# Patient Record
Sex: MALE | Race: WHITE | Employment: OTHER | URBAN - NONMETROPOLITAN AREA
[De-identification: names, ages, dates, MRNs, and addresses within clinical notes are randomized per-mention and may not be internally consistent; named-entity substitution may affect disease eponyms.]

---

## 2017-01-01 ENCOUNTER — HOSPITAL ENCOUNTER (OUTPATIENT)
Dept: MRI IMAGING | Age: 67
Discharge: HOME | End: 2017-03-20
Payer: OTHER GOVERNMENT

## 2017-01-01 DIAGNOSIS — G95.20 SPINAL CORD COMPRESSION (HCC): ICD-10-CM

## 2017-01-01 PROCEDURE — 6360000004 HC RX CONTRAST MEDICATION: Performed by: INTERNAL MEDICINE

## 2017-01-01 PROCEDURE — A9579 GAD-BASE MR CONTRAST NOS,1ML: HCPCS | Performed by: INTERNAL MEDICINE

## 2017-01-01 PROCEDURE — 72158 MRI LUMBAR SPINE W/O & W/DYE: CPT

## 2017-01-01 RX ADMIN — GADOPENTETATE DIMEGLUMINE 20 ML: 469.01 INJECTION INTRAVENOUS at 16:25

## 2017-03-01 ENCOUNTER — APPOINTMENT (OUTPATIENT)
Dept: GENERAL RADIOLOGY | Facility: HOSPITAL | Age: 67
End: 2017-03-01

## 2017-03-01 ENCOUNTER — APPOINTMENT (OUTPATIENT)
Dept: CT IMAGING | Facility: HOSPITAL | Age: 67
End: 2017-03-01

## 2017-03-01 ENCOUNTER — HOSPITAL ENCOUNTER (EMERGENCY)
Facility: HOSPITAL | Age: 67
Discharge: HOME | End: 2017-03-01
Attending: EMERGENCY MEDICINE | Admitting: EMERGENCY MEDICINE

## 2017-03-01 VITALS
HEART RATE: 90 BPM | RESPIRATION RATE: 16 BRPM | BODY MASS INDEX: 31.15 KG/M2 | WEIGHT: 230 LBS | SYSTOLIC BLOOD PRESSURE: 110 MMHG | TEMPERATURE: 98 F | OXYGEN SATURATION: 95 % | DIASTOLIC BLOOD PRESSURE: 70 MMHG | HEIGHT: 72 IN

## 2017-03-01 DIAGNOSIS — R91.8 LUNG MASS: ICD-10-CM

## 2017-03-01 DIAGNOSIS — R09.1 PLEURISY: Primary | ICD-10-CM

## 2017-03-01 DIAGNOSIS — J34.0 CELLULITIS OF NOSE: ICD-10-CM

## 2017-03-01 DIAGNOSIS — R16.0 LIVER MASS: ICD-10-CM

## 2017-03-01 LAB
ALBUMIN SERPL-MCNC: 3.7 G/DL (ref 3.5–5)
ALBUMIN/GLOB SERPL: 0.9 G/DL (ref 1.1–2.5)
ALP SERPL-CCNC: 180 U/L (ref 24–120)
ALT SERPL W P-5'-P-CCNC: 19 U/L (ref 0–54)
AMYLASE SERPL-CCNC: 45 U/L (ref 30–110)
ANION GAP SERPL CALCULATED.3IONS-SCNC: 11 MMOL/L (ref 4–13)
APTT PPP: 38.3 SECONDS (ref 24.1–34.8)
AST SERPL-CCNC: 24 U/L (ref 7–45)
BASOPHILS # BLD AUTO: 0.06 10*3/MM3 (ref 0–0.2)
BASOPHILS NFR BLD AUTO: 0.5 % (ref 0–2)
BILIRUB SERPL-MCNC: 0.7 MG/DL (ref 0.1–1)
BUN BLD-MCNC: 16 MG/DL (ref 5–21)
BUN/CREAT SERPL: 13.6 (ref 7–25)
CALCIUM SPEC-SCNC: 9.7 MG/DL (ref 8.4–10.4)
CHLORIDE SERPL-SCNC: 95 MMOL/L (ref 98–110)
CO2 SERPL-SCNC: 30 MMOL/L (ref 24–31)
CREAT BLD-MCNC: 1.18 MG/DL (ref 0.5–1.4)
D DIMER PPP FEU-MCNC: 1.77 MG/L (FEU) (ref 0–0.5)
DEPRECATED RDW RBC AUTO: 43.3 FL (ref 40–54)
EOSINOPHIL # BLD AUTO: 0.16 10*3/MM3 (ref 0–0.7)
EOSINOPHIL NFR BLD AUTO: 1.4 % (ref 0–4)
ERYTHROCYTE [DISTWIDTH] IN BLOOD BY AUTOMATED COUNT: 14.3 % (ref 12–15)
FLUAV AG NPH QL: NEGATIVE
FLUBV AG NPH QL IA: NEGATIVE
GFR SERPL CREATININE-BSD FRML MDRD: 62 ML/MIN/1.73
GLOBULIN UR ELPH-MCNC: 4.3 GM/DL
GLUCOSE BLD-MCNC: 109 MG/DL (ref 70–100)
HCT VFR BLD AUTO: 33 % (ref 40–52)
HGB BLD-MCNC: 10.5 G/DL (ref 14–18)
IMM GRANULOCYTES # BLD: 0.34 10*3/MM3 (ref 0–0.03)
IMM GRANULOCYTES NFR BLD: 3 % (ref 0–5)
INR PPP: 1.03 (ref 0.91–1.09)
LIPASE SERPL-CCNC: 18 U/L (ref 23–203)
LYMPHOCYTES # BLD AUTO: 1.25 10*3/MM3 (ref 0.72–4.86)
LYMPHOCYTES NFR BLD AUTO: 11 % (ref 15–45)
MCH RBC QN AUTO: 25.9 PG (ref 28–32)
MCHC RBC AUTO-ENTMCNC: 31.8 G/DL (ref 33–36)
MCV RBC AUTO: 81.5 FL (ref 82–95)
MONOCYTES # BLD AUTO: 1.13 10*3/MM3 (ref 0.19–1.3)
MONOCYTES NFR BLD AUTO: 9.9 % (ref 4–12)
NEUTROPHILS # BLD AUTO: 8.42 10*3/MM3 (ref 1.87–8.4)
NEUTROPHILS NFR BLD AUTO: 74.2 % (ref 39–78)
NT-PROBNP SERPL-MCNC: 242 PG/ML (ref 0–900)
PLATELET # BLD AUTO: 241 10*3/MM3 (ref 130–400)
PMV BLD AUTO: 9.3 FL (ref 6–12)
POTASSIUM BLD-SCNC: 4.1 MMOL/L (ref 3.5–5.3)
PROT SERPL-MCNC: 8 G/DL (ref 6.3–8.7)
PROTHROMBIN TIME: 13.8 SECONDS (ref 11.9–14.6)
RBC # BLD AUTO: 4.05 10*6/MM3 (ref 4.8–5.9)
SODIUM BLD-SCNC: 136 MMOL/L (ref 135–145)
TROPONIN I SERPL-MCNC: 0 NG/ML (ref 0–0.07)
TROPONIN I SERPL-MCNC: 0 NG/ML (ref 0–0.07)
WBC NRBC COR # BLD: 11.36 10*3/MM3 (ref 4.8–10.8)

## 2017-03-01 PROCEDURE — 96374 THER/PROPH/DIAG INJ IV PUSH: CPT

## 2017-03-01 PROCEDURE — 85379 FIBRIN DEGRADATION QUANT: CPT | Performed by: EMERGENCY MEDICINE

## 2017-03-01 PROCEDURE — 25010000002 HYDROMORPHONE PER 4 MG: Performed by: EMERGENCY MEDICINE

## 2017-03-01 PROCEDURE — 96376 TX/PRO/DX INJ SAME DRUG ADON: CPT

## 2017-03-01 PROCEDURE — 85730 THROMBOPLASTIN TIME PARTIAL: CPT | Performed by: EMERGENCY MEDICINE

## 2017-03-01 PROCEDURE — 83690 ASSAY OF LIPASE: CPT | Performed by: EMERGENCY MEDICINE

## 2017-03-01 PROCEDURE — 84484 ASSAY OF TROPONIN QUANT: CPT

## 2017-03-01 PROCEDURE — 87804 INFLUENZA ASSAY W/OPTIC: CPT | Performed by: EMERGENCY MEDICINE

## 2017-03-01 PROCEDURE — 85610 PROTHROMBIN TIME: CPT | Performed by: EMERGENCY MEDICINE

## 2017-03-01 PROCEDURE — 71010 HC CHEST PA OR AP: CPT

## 2017-03-01 PROCEDURE — 71275 CT ANGIOGRAPHY CHEST: CPT

## 2017-03-01 PROCEDURE — 83880 ASSAY OF NATRIURETIC PEPTIDE: CPT | Performed by: EMERGENCY MEDICINE

## 2017-03-01 PROCEDURE — 96375 TX/PRO/DX INJ NEW DRUG ADDON: CPT

## 2017-03-01 PROCEDURE — 99283 EMERGENCY DEPT VISIT LOW MDM: CPT

## 2017-03-01 PROCEDURE — 93005 ELECTROCARDIOGRAM TRACING: CPT

## 2017-03-01 PROCEDURE — 93005 ELECTROCARDIOGRAM TRACING: CPT | Performed by: EMERGENCY MEDICINE

## 2017-03-01 PROCEDURE — 93010 ELECTROCARDIOGRAM REPORT: CPT | Performed by: INTERNAL MEDICINE

## 2017-03-01 PROCEDURE — 85025 COMPLETE CBC W/AUTO DIFF WBC: CPT | Performed by: EMERGENCY MEDICINE

## 2017-03-01 PROCEDURE — 82150 ASSAY OF AMYLASE: CPT | Performed by: EMERGENCY MEDICINE

## 2017-03-01 PROCEDURE — 25010000002 ONDANSETRON PER 1 MG: Performed by: EMERGENCY MEDICINE

## 2017-03-01 PROCEDURE — 80053 COMPREHEN METABOLIC PANEL: CPT | Performed by: EMERGENCY MEDICINE

## 2017-03-01 PROCEDURE — 0 IOPAMIDOL PER 1 ML: Performed by: EMERGENCY MEDICINE

## 2017-03-01 PROCEDURE — 36415 COLL VENOUS BLD VENIPUNCTURE: CPT | Performed by: EMERGENCY MEDICINE

## 2017-03-01 PROCEDURE — 74177 CT ABD & PELVIS W/CONTRAST: CPT

## 2017-03-01 RX ORDER — CYCLOBENZAPRINE HCL 10 MG
10 TABLET ORAL 3 TIMES DAILY
COMMUNITY

## 2017-03-01 RX ORDER — TRAZODONE HYDROCHLORIDE 100 MG/1
50 TABLET ORAL NIGHTLY
COMMUNITY

## 2017-03-01 RX ORDER — ZOLPIDEM TARTRATE 10 MG/1
10 TABLET ORAL NIGHTLY PRN
COMMUNITY
End: 2017-03-30 | Stop reason: HOSPADM

## 2017-03-01 RX ORDER — GABAPENTIN 300 MG/1
300 CAPSULE ORAL NIGHTLY
COMMUNITY

## 2017-03-01 RX ORDER — ALPRAZOLAM 1 MG/1
2 TABLET ORAL DAILY
Status: ON HOLD | COMMUNITY
End: 2017-03-30

## 2017-03-01 RX ORDER — HYDROCODONE BITARTRATE AND ACETAMINOPHEN 10; 325 MG/1; MG/1
2 TABLET ORAL EVERY 4 HOURS PRN
COMMUNITY
End: 2017-03-30 | Stop reason: HOSPADM

## 2017-03-01 RX ORDER — ALBUTEROL SULFATE 2.5 MG/3ML
2.5 SOLUTION RESPIRATORY (INHALATION) EVERY 4 HOURS PRN
COMMUNITY

## 2017-03-01 RX ORDER — PANTOPRAZOLE SODIUM 40 MG/1
40 TABLET, DELAYED RELEASE ORAL EVERY MORNING
COMMUNITY

## 2017-03-01 RX ORDER — LOSARTAN POTASSIUM 100 MG/1
50 TABLET ORAL DAILY
COMMUNITY

## 2017-03-01 RX ORDER — SULFAMETHOXAZOLE AND TRIMETHOPRIM 800; 160 MG/1; MG/1
1 TABLET ORAL 2 TIMES DAILY
Qty: 20 TABLET | Refills: 0 | Status: ON HOLD | OUTPATIENT
Start: 2017-03-01 | End: 2017-03-28

## 2017-03-01 RX ORDER — DULOXETIN HYDROCHLORIDE 60 MG/1
120 CAPSULE, DELAYED RELEASE ORAL NIGHTLY
COMMUNITY

## 2017-03-01 RX ORDER — METHYLPREDNISOLONE 4 MG/1
TABLET ORAL
Qty: 21 TABLET | Refills: 0 | Status: ON HOLD | OUTPATIENT
Start: 2017-03-01 | End: 2017-03-28

## 2017-03-01 RX ORDER — ONDANSETRON 2 MG/ML
4 INJECTION INTRAMUSCULAR; INTRAVENOUS ONCE
Status: COMPLETED | OUTPATIENT
Start: 2017-03-01 | End: 2017-03-01

## 2017-03-01 RX ORDER — NABUMETONE 750 MG/1
750 TABLET, FILM COATED ORAL EVERY 12 HOURS
COMMUNITY

## 2017-03-01 RX ORDER — HYDROMORPHONE HYDROCHLORIDE 4 MG/1
4 TABLET ORAL
COMMUNITY
End: 2017-04-12 | Stop reason: SDUPTHER

## 2017-03-01 RX ADMIN — HYDROMORPHONE HYDROCHLORIDE 1 MG: 1 INJECTION, SOLUTION INTRAMUSCULAR; INTRAVENOUS; SUBCUTANEOUS at 19:40

## 2017-03-01 RX ADMIN — IOPAMIDOL 150 ML: 755 INJECTION, SOLUTION INTRAVENOUS at 17:37

## 2017-03-01 RX ADMIN — ONDANSETRON 4 MG: 2 INJECTION INTRAMUSCULAR; INTRAVENOUS at 19:39

## 2017-03-01 RX ADMIN — HYDROMORPHONE HYDROCHLORIDE 1 MG: 1 INJECTION, SOLUTION INTRAMUSCULAR; INTRAVENOUS; SUBCUTANEOUS at 16:14

## 2017-03-01 RX ADMIN — ONDANSETRON 4 MG: 2 INJECTION INTRAMUSCULAR; INTRAVENOUS at 16:14

## 2017-03-01 NOTE — ED PROVIDER NOTES
"Subjective chest pain, shortness of breath, abdominal pain and decreased urine output  History of Present Illness  Mr. Lundy presents today with complaint of chest pain, shortness of breath and decreased urine output.  He tells me that the generalized symptoms started approximately one week ago.  His wife states that he is having chest pain  As well as shortness of breath.   He tells me he has pain everywhere.   He does admit that the pain he has worsens with deep inspiration.  The cough he has been experiencing has been productive of green sputum.     He had a very severe illness last year and he does not want to have a repeat of that illness.  He tells me that he almost  from pnuemococcal sepsis.    He does have a history of COPD but does not feel that he is having a flare up.  He also complained of abdominal pain and decreased urine output over the last 24 hours.    Review of Systems   HENT: Negative.    Eyes: Negative.    Respiratory: Positive for cough, chest tightness and shortness of breath. Negative for wheezing.    Cardiovascular: Positive for chest pain.   Gastrointestinal: Positive for abdominal pain. Negative for diarrhea, nausea and vomiting.   Endocrine: Negative.    Genitourinary: Positive for decreased urine volume.   Musculoskeletal: Negative.    Skin: Negative.    Allergic/Immunologic: Negative.    Neurological: Positive for weakness.   Hematological: Negative.    Psychiatric/Behavioral: Negative.    All other systems reviewed and are negative.      Past Medical History   Diagnosis Date   • Chronic pain    • COPD (chronic obstructive pulmonary disease)    • GERD (gastroesophageal reflux disease)    • Hypertension        Allergies   Allergen Reactions   • Fentanyl Mental Status Change     Made aggressive, \"out of my mind\"       History reviewed. No pertinent past surgical history.    History reviewed. No pertinent family history.    Social History     Social History   • Marital status: "      Spouse name: N/A   • Number of children: N/A   • Years of education: N/A     Social History Main Topics   • Smoking status: None   • Smokeless tobacco: None   • Alcohol use None   • Drug use: None   • Sexual activity: Not Asked     Other Topics Concern   • None     Social History Narrative   • None           Objective   Physical Exam   Constitutional: He is oriented to person, place, and time. He appears well-developed and well-nourished.   HENT:   Head: Normocephalic and atraumatic.   Right Ear: External ear normal.   Left Ear: External ear normal.   Nose: Nose normal.   Mouth/Throat: Oropharynx is clear and moist.   Eyes: Conjunctivae and EOM are normal. Pupils are equal, round, and reactive to light. Right eye exhibits no discharge. Left eye exhibits no discharge.   Neck: Normal range of motion. Neck supple. No thyromegaly present.   Cardiovascular: Normal rate, regular rhythm, normal heart sounds and intact distal pulses.  Exam reveals no friction rub.    No murmur heard.  Pulmonary/Chest: Effort normal and breath sounds normal. No respiratory distress.   Abdominal: Soft. Bowel sounds are normal. He exhibits no distension. There is no tenderness.   Musculoskeletal: Normal range of motion. He exhibits no edema or deformity.   Neurological: He is alert and oriented to person, place, and time. He has normal reflexes. No cranial nerve deficit.   Skin: Skin is warm and dry. No rash noted.   Psychiatric: Judgment normal.   Nursing note and vitals reviewed.      Procedures         ED Course  ED Course   Comment By Time   Told the patient his testing was negative for MI and sounds pleuritic and we can treat that.  However, his CTs show possible CA with liver mets and that needs prompt evaluation.  [facility] so I told him to make appointment with them as soon as possible and gave him copies of the CT reports to take to them. Carlos Holm Jr., MD 03/01 1953                   MDM    Final diagnoses:   Pleurisy   Lung mass   Liver mass   Cellulitis of nose            Carlos Holm Jr., MD  03/01/17 1953

## 2017-03-02 NOTE — PROGRESS NOTES
Clinical from ED visit faxed to Karen at [facility].  Li Alcantar RN.3/1/2017.9:04 PM.

## 2017-03-28 ENCOUNTER — HOSPITAL ENCOUNTER (INPATIENT)
Facility: HOSPITAL | Age: 67
LOS: 2 days | Discharge: HOME | End: 2017-03-30
Attending: INTERNAL MEDICINE | Admitting: INTERNAL MEDICINE

## 2017-03-28 ENCOUNTER — APPOINTMENT (OUTPATIENT)
Dept: MRI IMAGING | Facility: HOSPITAL | Age: 67
End: 2017-03-28

## 2017-03-28 DIAGNOSIS — C79.51 PAIN FROM BONE METASTASES (HCC): Primary | ICD-10-CM

## 2017-03-28 DIAGNOSIS — C78.7 LIVER METASTASIS: ICD-10-CM

## 2017-03-28 DIAGNOSIS — G89.3 PAIN FROM BONE METASTASES (HCC): Primary | ICD-10-CM

## 2017-03-28 PROBLEM — R51.9 HEADACHE: Status: ACTIVE | Noted: 2017-03-28

## 2017-03-28 PROBLEM — R29.898 LOWER EXTREMITY WEAKNESS: Status: ACTIVE | Noted: 2017-03-28

## 2017-03-28 PROBLEM — M48.56XA PATHOLOGIC COMPRESSION FRACTURE OF LUMBAR VERTEBRA (HCC): Status: ACTIVE | Noted: 2017-03-28

## 2017-03-28 PROBLEM — R11.0 NAUSEA: Status: ACTIVE | Noted: 2017-03-28

## 2017-03-28 PROBLEM — R91.1 LUNG NODULE: Status: ACTIVE | Noted: 2017-03-28

## 2017-03-28 LAB
ALBUMIN SERPL-MCNC: 3.6 G/DL (ref 3.5–5)
ALBUMIN/GLOB SERPL: 0.9 G/DL (ref 1.1–2.5)
ALP SERPL-CCNC: 204 U/L (ref 24–120)
ALT SERPL W P-5'-P-CCNC: 26 U/L (ref 0–54)
ANION GAP SERPL CALCULATED.3IONS-SCNC: 12 MMOL/L (ref 4–13)
APTT PPP: 30.1 SECONDS (ref 24.1–34.8)
AST SERPL-CCNC: 44 U/L (ref 7–45)
BILIRUB SERPL-MCNC: 0.5 MG/DL (ref 0.1–1)
BUN BLD-MCNC: 31 MG/DL (ref 5–21)
BUN/CREAT SERPL: 30.4 (ref 7–25)
CALCIUM SPEC-SCNC: 9.9 MG/DL (ref 8.4–10.4)
CHLORIDE SERPL-SCNC: 96 MMOL/L (ref 98–110)
CO2 SERPL-SCNC: 28 MMOL/L (ref 24–31)
CREAT BLD-MCNC: 1.02 MG/DL (ref 0.5–1.4)
DEPRECATED RDW RBC AUTO: 47.7 FL (ref 40–54)
ERYTHROCYTE [DISTWIDTH] IN BLOOD BY AUTOMATED COUNT: 16.6 % (ref 12–15)
GFR SERPL CREATININE-BSD FRML MDRD: 73 ML/MIN/1.73
GLOBULIN UR ELPH-MCNC: 3.8 GM/DL
GLUCOSE BLD-MCNC: 101 MG/DL (ref 70–100)
HCT VFR BLD AUTO: 33.7 % (ref 40–52)
HGB BLD-MCNC: 10.6 G/DL (ref 14–18)
INR PPP: 1.02 (ref 0.91–1.09)
MCH RBC QN AUTO: 25.5 PG (ref 28–32)
MCHC RBC AUTO-ENTMCNC: 31.5 G/DL (ref 33–36)
MCV RBC AUTO: 81 FL (ref 82–95)
PLATELET # BLD AUTO: 277 10*3/MM3 (ref 130–400)
PMV BLD AUTO: 9.8 FL (ref 6–12)
POTASSIUM BLD-SCNC: 4.3 MMOL/L (ref 3.5–5.3)
PROT SERPL-MCNC: 7.4 G/DL (ref 6.3–8.7)
PROTHROMBIN TIME: 13.7 SECONDS (ref 11.9–14.6)
RBC # BLD AUTO: 4.16 10*6/MM3 (ref 4.8–5.9)
SODIUM BLD-SCNC: 136 MMOL/L (ref 135–145)
WBC NRBC COR # BLD: 22.11 10*3/MM3 (ref 4.8–10.8)

## 2017-03-28 PROCEDURE — 85027 COMPLETE CBC AUTOMATED: CPT | Performed by: NURSE PRACTITIONER

## 2017-03-28 PROCEDURE — 25010000002 HYDROMORPHONE PER 4 MG: Performed by: NURSE PRACTITIONER

## 2017-03-28 PROCEDURE — A9577 INJ MULTIHANCE: HCPCS | Performed by: INTERNAL MEDICINE

## 2017-03-28 PROCEDURE — 72157 MRI CHEST SPINE W/O & W/DYE: CPT

## 2017-03-28 PROCEDURE — 85730 THROMBOPLASTIN TIME PARTIAL: CPT | Performed by: NURSE PRACTITIONER

## 2017-03-28 PROCEDURE — 80053 COMPREHEN METABOLIC PANEL: CPT | Performed by: NURSE PRACTITIONER

## 2017-03-28 PROCEDURE — 70553 MRI BRAIN STEM W/O & W/DYE: CPT

## 2017-03-28 PROCEDURE — 85610 PROTHROMBIN TIME: CPT | Performed by: NURSE PRACTITIONER

## 2017-03-28 PROCEDURE — 63710000001 DEXAMETHASONE PER 0.25 MG: Performed by: NURSE PRACTITIONER

## 2017-03-28 PROCEDURE — 0 GADOBENATE DIMEGLUMINE 529 MG/ML SOLUTION: Performed by: INTERNAL MEDICINE

## 2017-03-28 RX ORDER — CYCLOBENZAPRINE HCL 10 MG
10 TABLET ORAL EVERY 8 HOURS SCHEDULED
Status: DISCONTINUED | OUTPATIENT
Start: 2017-03-28 | End: 2017-03-30 | Stop reason: HOSPADM

## 2017-03-28 RX ORDER — SODIUM CHLORIDE 0.9 % (FLUSH) 0.9 %
1-10 SYRINGE (ML) INJECTION AS NEEDED
Status: DISCONTINUED | OUTPATIENT
Start: 2017-03-28 | End: 2017-03-30 | Stop reason: HOSPADM

## 2017-03-28 RX ORDER — POLYETHYLENE GLYCOL 3350 17 G/17G
17 POWDER, FOR SOLUTION ORAL NIGHTLY
COMMUNITY

## 2017-03-28 RX ORDER — HYDROCODONE BITARTRATE AND ACETAMINOPHEN 10; 325 MG/1; MG/1
1 TABLET ORAL EVERY 4 HOURS PRN
Status: DISCONTINUED | OUTPATIENT
Start: 2017-03-28 | End: 2017-03-30 | Stop reason: HOSPADM

## 2017-03-28 RX ORDER — DULOXETIN HYDROCHLORIDE 30 MG/1
120 CAPSULE, DELAYED RELEASE ORAL DAILY
Status: DISCONTINUED | OUTPATIENT
Start: 2017-03-28 | End: 2017-03-30 | Stop reason: HOSPADM

## 2017-03-28 RX ORDER — POLYETHYLENE GLYCOL 3350 17 G/17G
17 POWDER, FOR SOLUTION ORAL DAILY
Status: DISCONTINUED | OUTPATIENT
Start: 2017-03-28 | End: 2017-03-29 | Stop reason: SDUPTHER

## 2017-03-28 RX ORDER — ONDANSETRON 2 MG/ML
4 INJECTION INTRAMUSCULAR; INTRAVENOUS EVERY 6 HOURS PRN
Status: DISCONTINUED | OUTPATIENT
Start: 2017-03-28 | End: 2017-03-30 | Stop reason: HOSPADM

## 2017-03-28 RX ORDER — TRAZODONE HYDROCHLORIDE 50 MG/1
50 TABLET ORAL NIGHTLY
Status: DISCONTINUED | OUTPATIENT
Start: 2017-03-28 | End: 2017-03-30 | Stop reason: HOSPADM

## 2017-03-28 RX ORDER — UREA 10 %
3 LOTION (ML) TOPICAL DAILY
Status: ON HOLD | COMMUNITY
End: 2017-03-30

## 2017-03-28 RX ORDER — SODIUM CHLORIDE 9 MG/ML
50 INJECTION, SOLUTION INTRAVENOUS CONTINUOUS
Status: DISCONTINUED | OUTPATIENT
Start: 2017-03-28 | End: 2017-03-30 | Stop reason: HOSPADM

## 2017-03-28 RX ORDER — DEXAMETHASONE 4 MG/1
4 TABLET ORAL EVERY 6 HOURS SCHEDULED
Status: DISCONTINUED | OUTPATIENT
Start: 2017-03-28 | End: 2017-03-30 | Stop reason: HOSPADM

## 2017-03-28 RX ORDER — PANTOPRAZOLE SODIUM 40 MG/10ML
40 INJECTION, POWDER, LYOPHILIZED, FOR SOLUTION INTRAVENOUS
Status: DISCONTINUED | OUTPATIENT
Start: 2017-03-28 | End: 2017-03-30 | Stop reason: HOSPADM

## 2017-03-28 RX ORDER — ALPRAZOLAM 0.5 MG/1
1 TABLET ORAL 2 TIMES DAILY PRN
Status: DISCONTINUED | OUTPATIENT
Start: 2017-03-28 | End: 2017-03-30 | Stop reason: HOSPADM

## 2017-03-28 RX ORDER — ZOLPIDEM TARTRATE 5 MG/1
5 TABLET ORAL NIGHTLY PRN
Status: DISCONTINUED | OUTPATIENT
Start: 2017-03-28 | End: 2017-03-30 | Stop reason: HOSPADM

## 2017-03-28 RX ORDER — HYDROMORPHONE HCL 110MG/55ML
2 PATIENT CONTROLLED ANALGESIA SYRINGE INTRAVENOUS
Status: DISCONTINUED | OUTPATIENT
Start: 2017-03-28 | End: 2017-03-30 | Stop reason: HOSPADM

## 2017-03-28 RX ADMIN — DEXAMETHASONE 4 MG: 4 TABLET ORAL at 23:23

## 2017-03-28 RX ADMIN — DEXAMETHASONE 4 MG: 4 TABLET ORAL at 19:20

## 2017-03-28 RX ADMIN — CYCLOBENZAPRINE HYDROCHLORIDE 10 MG: 10 TABLET, FILM COATED ORAL at 19:20

## 2017-03-28 RX ADMIN — HYDROCODONE BITARTRATE AND ACETAMINOPHEN 1 TABLET: 10; 325 TABLET ORAL at 19:20

## 2017-03-28 RX ADMIN — POLYETHYLENE GLYCOL 3350 17 G: 17 POWDER, FOR SOLUTION ORAL at 19:55

## 2017-03-28 RX ADMIN — HYDROCODONE BITARTRATE AND ACETAMINOPHEN 1 TABLET: 10; 325 TABLET ORAL at 23:22

## 2017-03-28 RX ADMIN — DULOXETINE HYDROCHLORIDE 120 MG: 30 CAPSULE, DELAYED RELEASE ORAL at 19:19

## 2017-03-28 RX ADMIN — ZOLPIDEM TARTRATE 5 MG: 5 TABLET, FILM COATED ORAL at 20:38

## 2017-03-28 RX ADMIN — TRAZODONE HYDROCHLORIDE 50 MG: 50 TABLET ORAL at 20:38

## 2017-03-28 RX ADMIN — ALPRAZOLAM 1 MG: 0.5 TABLET ORAL at 20:44

## 2017-03-28 RX ADMIN — HYDROMORPHONE HYDROCHLORIDE 2 MG: 2 INJECTION, SOLUTION INTRAMUSCULAR; INTRAVENOUS; SUBCUTANEOUS at 20:38

## 2017-03-28 RX ADMIN — GADOBENATE DIMEGLUMINE 15 ML: 529 INJECTION, SOLUTION INTRAVENOUS at 19:30

## 2017-03-28 RX ADMIN — CYCLOBENZAPRINE HYDROCHLORIDE 10 MG: 10 TABLET, FILM COATED ORAL at 23:22

## 2017-03-28 RX ADMIN — HYDROMORPHONE HYDROCHLORIDE 2 MG: 2 INJECTION, SOLUTION INTRAMUSCULAR; INTRAVENOUS; SUBCUTANEOUS at 17:21

## 2017-03-28 RX ADMIN — PANTOPRAZOLE SODIUM 40 MG: 40 INJECTION, POWDER, FOR SOLUTION INTRAVENOUS at 19:20

## 2017-03-28 RX ADMIN — SODIUM CHLORIDE 50 ML/HR: 9 INJECTION, SOLUTION INTRAVENOUS at 17:22

## 2017-03-29 ENCOUNTER — APPOINTMENT (OUTPATIENT)
Dept: NUCLEAR MEDICINE | Facility: HOSPITAL | Age: 67
End: 2017-03-29

## 2017-03-29 ENCOUNTER — APPOINTMENT (OUTPATIENT)
Dept: CT IMAGING | Facility: HOSPITAL | Age: 67
End: 2017-03-29

## 2017-03-29 ENCOUNTER — APPOINTMENT (OUTPATIENT)
Dept: MRI IMAGING | Facility: HOSPITAL | Age: 67
End: 2017-03-29

## 2017-03-29 PROCEDURE — 88305 TISSUE EXAM BY PATHOLOGIST: CPT | Performed by: INTERNAL MEDICINE

## 2017-03-29 PROCEDURE — 88342 IMHCHEM/IMCYTCHM 1ST ANTB: CPT | Performed by: INTERNAL MEDICINE

## 2017-03-29 PROCEDURE — 25010000002 HYDROMORPHONE PER 4 MG: Performed by: NURSE PRACTITIONER

## 2017-03-29 PROCEDURE — 77012 CT SCAN FOR NEEDLE BIOPSY: CPT

## 2017-03-29 PROCEDURE — 88172 CYTP DX EVAL FNA 1ST EA SITE: CPT | Performed by: INTERNAL MEDICINE

## 2017-03-29 PROCEDURE — 63710000001 DEXAMETHASONE PER 0.25 MG: Performed by: NURSE PRACTITIONER

## 2017-03-29 PROCEDURE — 25010000002 ONDANSETRON PER 1 MG: Performed by: NURSE PRACTITIONER

## 2017-03-29 PROCEDURE — 0 TECHNETIUM OXIDRONATE KIT: Performed by: INTERNAL MEDICINE

## 2017-03-29 PROCEDURE — 88341 IMHCHEM/IMCYTCHM EA ADD ANTB: CPT | Performed by: INTERNAL MEDICINE

## 2017-03-29 PROCEDURE — 99222 1ST HOSP IP/OBS MODERATE 55: CPT | Performed by: NEUROLOGICAL SURGERY

## 2017-03-29 PROCEDURE — 0FB13ZX EXCISION OF RIGHT LOBE LIVER, PERCUTANEOUS APPROACH, DIAGNOSTIC: ICD-10-PCS | Performed by: INTERNAL MEDICINE

## 2017-03-29 PROCEDURE — 88334 PATH CONSLTJ SURG CYTO XM EA: CPT | Performed by: INTERNAL MEDICINE

## 2017-03-29 PROCEDURE — 0 GADOBENATE DIMEGLUMINE 529 MG/ML SOLUTION: Performed by: INTERNAL MEDICINE

## 2017-03-29 PROCEDURE — 88177 CYTP FNA EVAL EA ADDL: CPT | Performed by: INTERNAL MEDICINE

## 2017-03-29 PROCEDURE — 78306 BONE IMAGING WHOLE BODY: CPT

## 2017-03-29 PROCEDURE — 88173 CYTOPATH EVAL FNA REPORT: CPT | Performed by: INTERNAL MEDICINE

## 2017-03-29 PROCEDURE — A9577 INJ MULTIHANCE: HCPCS | Performed by: INTERNAL MEDICINE

## 2017-03-29 PROCEDURE — A9561 TC99M OXIDRONATE: HCPCS | Performed by: INTERNAL MEDICINE

## 2017-03-29 PROCEDURE — 72156 MRI NECK SPINE W/O & W/DYE: CPT

## 2017-03-29 RX ORDER — POLYETHYLENE GLYCOL 3350 17 G/17G
17 POWDER, FOR SOLUTION ORAL NIGHTLY
Status: DISCONTINUED | OUTPATIENT
Start: 2017-03-29 | End: 2017-03-30 | Stop reason: HOSPADM

## 2017-03-29 RX ADMIN — CYCLOBENZAPRINE HYDROCHLORIDE 10 MG: 10 TABLET, FILM COATED ORAL at 22:02

## 2017-03-29 RX ADMIN — HYDROCODONE BITARTRATE AND ACETAMINOPHEN 1 TABLET: 10; 325 TABLET ORAL at 14:19

## 2017-03-29 RX ADMIN — CYCLOBENZAPRINE HYDROCHLORIDE 10 MG: 10 TABLET, FILM COATED ORAL at 14:18

## 2017-03-29 RX ADMIN — TRAZODONE HYDROCHLORIDE 50 MG: 50 TABLET ORAL at 20:15

## 2017-03-29 RX ADMIN — DEXAMETHASONE 4 MG: 4 TABLET ORAL at 06:26

## 2017-03-29 RX ADMIN — ZOLPIDEM TARTRATE 5 MG: 5 TABLET, FILM COATED ORAL at 20:15

## 2017-03-29 RX ADMIN — DEXAMETHASONE 4 MG: 4 TABLET ORAL at 22:02

## 2017-03-29 RX ADMIN — HYDROMORPHONE HYDROCHLORIDE 2 MG: 2 INJECTION, SOLUTION INTRAMUSCULAR; INTRAVENOUS; SUBCUTANEOUS at 03:34

## 2017-03-29 RX ADMIN — HYDROCODONE BITARTRATE AND ACETAMINOPHEN 1 TABLET: 10; 325 TABLET ORAL at 18:10

## 2017-03-29 RX ADMIN — HYDROMORPHONE HYDROCHLORIDE 2 MG: 2 INJECTION, SOLUTION INTRAMUSCULAR; INTRAVENOUS; SUBCUTANEOUS at 13:06

## 2017-03-29 RX ADMIN — HYDROMORPHONE HYDROCHLORIDE 2 MG: 2 INJECTION, SOLUTION INTRAMUSCULAR; INTRAVENOUS; SUBCUTANEOUS at 19:13

## 2017-03-29 RX ADMIN — GADOBENATE DIMEGLUMINE 20 ML: 529 INJECTION, SOLUTION INTRAVENOUS at 12:15

## 2017-03-29 RX ADMIN — ONDANSETRON HYDROCHLORIDE 4 MG: 2 SOLUTION INTRAMUSCULAR; INTRAVENOUS at 14:18

## 2017-03-29 RX ADMIN — HYDROMORPHONE HYDROCHLORIDE 2 MG: 2 INJECTION, SOLUTION INTRAMUSCULAR; INTRAVENOUS; SUBCUTANEOUS at 08:40

## 2017-03-29 RX ADMIN — ALPRAZOLAM 1 MG: 0.5 TABLET ORAL at 22:12

## 2017-03-29 RX ADMIN — CYCLOBENZAPRINE HYDROCHLORIDE 10 MG: 10 TABLET, FILM COATED ORAL at 06:26

## 2017-03-29 RX ADMIN — HYDROCODONE BITARTRATE AND ACETAMINOPHEN 1 TABLET: 10; 325 TABLET ORAL at 06:26

## 2017-03-29 RX ADMIN — DULOXETINE HYDROCHLORIDE 120 MG: 30 CAPSULE, DELAYED RELEASE ORAL at 14:18

## 2017-03-29 RX ADMIN — HYDROMORPHONE HYDROCHLORIDE 2 MG: 2 INJECTION, SOLUTION INTRAMUSCULAR; INTRAVENOUS; SUBCUTANEOUS at 16:17

## 2017-03-29 RX ADMIN — HYDROMORPHONE HYDROCHLORIDE 2 MG: 2 INJECTION, SOLUTION INTRAMUSCULAR; INTRAVENOUS; SUBCUTANEOUS at 22:02

## 2017-03-29 RX ADMIN — PANTOPRAZOLE SODIUM 40 MG: 40 INJECTION, POWDER, FOR SOLUTION INTRAVENOUS at 06:26

## 2017-03-29 RX ADMIN — TECHNETIUM TC 99M OXIDRONATE 1 DOSE: 3.15 INJECTION, POWDER, LYOPHILIZED, FOR SOLUTION INTRAVENOUS at 10:25

## 2017-03-29 RX ADMIN — POLYETHYLENE GLYCOL 3350 17 G: 17 POWDER, FOR SOLUTION ORAL at 20:15

## 2017-03-29 RX ADMIN — DEXAMETHASONE 4 MG: 4 TABLET ORAL at 17:56

## 2017-03-29 RX ADMIN — DEXAMETHASONE 4 MG: 4 TABLET ORAL at 14:18

## 2017-03-30 ENCOUNTER — APPOINTMENT (OUTPATIENT)
Dept: RADIATION ONCOLOGY | Facility: HOSPITAL | Age: 67
End: 2017-03-30

## 2017-03-30 ENCOUNTER — CONSULT (OUTPATIENT)
Dept: RADIATION ONCOLOGY | Facility: HOSPITAL | Age: 67
End: 2017-03-30

## 2017-03-30 ENCOUNTER — APPOINTMENT (OUTPATIENT)
Dept: GENERAL RADIOLOGY | Facility: HOSPITAL | Age: 67
End: 2017-03-30

## 2017-03-30 VITALS
RESPIRATION RATE: 16 BRPM | TEMPERATURE: 97.3 F | DIASTOLIC BLOOD PRESSURE: 89 MMHG | SYSTOLIC BLOOD PRESSURE: 133 MMHG | HEART RATE: 87 BPM | HEIGHT: 72 IN | OXYGEN SATURATION: 97 % | BODY MASS INDEX: 31.15 KG/M2 | WEIGHT: 230 LBS

## 2017-03-30 DIAGNOSIS — C78.7 LIVER METASTASIS: ICD-10-CM

## 2017-03-30 DIAGNOSIS — G89.3 PAIN FROM BONE METASTASES (HCC): Primary | ICD-10-CM

## 2017-03-30 DIAGNOSIS — Z71.9 ENCOUNTER FOR CONSULTATION: ICD-10-CM

## 2017-03-30 DIAGNOSIS — C79.51 PAIN FROM BONE METASTASES (HCC): Primary | ICD-10-CM

## 2017-03-30 DIAGNOSIS — R29.898 WEAKNESS OF BOTH LOWER EXTREMITIES: ICD-10-CM

## 2017-03-30 DIAGNOSIS — M48.56XA PATHOLOGIC COMPRESSION FRACTURE OF LUMBAR VERTEBRA, INITIAL ENCOUNTER (HCC): ICD-10-CM

## 2017-03-30 PROCEDURE — 99231 SBSQ HOSP IP/OBS SF/LOW 25: CPT | Performed by: NEUROLOGICAL SURGERY

## 2017-03-30 PROCEDURE — 77334 RADIATION TREATMENT AID(S): CPT | Performed by: RADIOLOGY

## 2017-03-30 PROCEDURE — 63710000001 DEXAMETHASONE PER 0.25 MG: Performed by: NURSE PRACTITIONER

## 2017-03-30 PROCEDURE — 77290 THER RAD SIMULAJ FIELD CPLX: CPT | Performed by: RADIOLOGY

## 2017-03-30 PROCEDURE — 25010000002 HYDROMORPHONE PER 4 MG: Performed by: NURSE PRACTITIONER

## 2017-03-30 PROCEDURE — 73060 X-RAY EXAM OF HUMERUS: CPT

## 2017-03-30 RX ORDER — OXYCODONE AND ACETAMINOPHEN 7.5; 325 MG/1; MG/1
1 TABLET ORAL EVERY 4 HOURS PRN
Status: DISCONTINUED | OUTPATIENT
Start: 2017-03-30 | End: 2017-03-30 | Stop reason: HOSPADM

## 2017-03-30 RX ORDER — ALPRAZOLAM 1 MG/1
1 TABLET ORAL EVERY 12 HOURS PRN
Qty: 30 TABLET | Refills: 0 | Status: SHIPPED | OUTPATIENT
Start: 2017-03-30

## 2017-03-30 RX ORDER — LANOLIN ALCOHOL/MO/W.PET/CERES
3 CREAM (GRAM) TOPICAL DAILY
Qty: 30 TABLET | Refills: 0 | Status: ON HOLD | OUTPATIENT
Start: 2017-03-30 | End: 2017-04-17

## 2017-03-30 RX ORDER — OXYCODONE AND ACETAMINOPHEN 7.5; 325 MG/1; MG/1
1 TABLET ORAL EVERY 4 HOURS PRN
Qty: 120 TABLET | Refills: 0
Start: 2017-03-30 | End: 2017-04-09

## 2017-03-30 RX ADMIN — HYDROMORPHONE HYDROCHLORIDE 2 MG: 2 INJECTION, SOLUTION INTRAMUSCULAR; INTRAVENOUS; SUBCUTANEOUS at 14:14

## 2017-03-30 RX ADMIN — PANTOPRAZOLE SODIUM 40 MG: 40 INJECTION, POWDER, FOR SOLUTION INTRAVENOUS at 08:02

## 2017-03-30 RX ADMIN — HYDROMORPHONE HYDROCHLORIDE 2 MG: 2 INJECTION, SOLUTION INTRAMUSCULAR; INTRAVENOUS; SUBCUTANEOUS at 01:07

## 2017-03-30 RX ADMIN — DEXAMETHASONE 4 MG: 4 TABLET ORAL at 13:31

## 2017-03-30 RX ADMIN — CYCLOBENZAPRINE HYDROCHLORIDE 10 MG: 10 TABLET, FILM COATED ORAL at 13:31

## 2017-03-30 RX ADMIN — CYCLOBENZAPRINE HYDROCHLORIDE 10 MG: 10 TABLET, FILM COATED ORAL at 05:10

## 2017-03-30 RX ADMIN — OXYCODONE HYDROCHLORIDE AND ACETAMINOPHEN 1 TABLET: 7.5; 325 TABLET ORAL at 09:53

## 2017-03-30 RX ADMIN — DULOXETINE HYDROCHLORIDE 120 MG: 30 CAPSULE, DELAYED RELEASE ORAL at 11:00

## 2017-03-30 RX ADMIN — HYDROCODONE BITARTRATE AND ACETAMINOPHEN 1 TABLET: 10; 325 TABLET ORAL at 13:31

## 2017-03-30 RX ADMIN — HYDROMORPHONE HYDROCHLORIDE 2 MG: 2 INJECTION, SOLUTION INTRAMUSCULAR; INTRAVENOUS; SUBCUTANEOUS at 05:05

## 2017-03-30 RX ADMIN — HYDROMORPHONE HYDROCHLORIDE 2 MG: 2 INJECTION, SOLUTION INTRAMUSCULAR; INTRAVENOUS; SUBCUTANEOUS at 10:54

## 2017-03-30 RX ADMIN — DEXAMETHASONE 4 MG: 4 TABLET ORAL at 08:02

## 2017-03-30 RX ADMIN — HYDROMORPHONE HYDROCHLORIDE 2 MG: 2 INJECTION, SOLUTION INTRAMUSCULAR; INTRAVENOUS; SUBCUTANEOUS at 08:02

## 2017-04-03 ENCOUNTER — HOSPITAL ENCOUNTER (OUTPATIENT)
Dept: RADIATION ONCOLOGY | Facility: HOSPITAL | Age: 67
Setting detail: RADIATION/ONCOLOGY SERIES
End: 2017-04-03

## 2017-04-04 ENCOUNTER — HOSPITAL ENCOUNTER (OUTPATIENT)
Dept: RADIATION ONCOLOGY | Facility: HOSPITAL | Age: 67
Discharge: HOME | End: 2017-04-04

## 2017-04-04 PROCEDURE — 77334 RADIATION TREATMENT AID(S): CPT | Performed by: RADIOLOGY

## 2017-04-04 PROCEDURE — 77412 RADIATION TX DELIVERY LVL 3: CPT | Performed by: RADIOLOGY

## 2017-04-04 PROCEDURE — 77470 SPECIAL RADIATION TREATMENT: CPT | Performed by: RADIOLOGY

## 2017-04-04 PROCEDURE — 77307 TELETHX ISODOSE PLAN CPLX: CPT | Performed by: RADIOLOGY

## 2017-04-04 PROCEDURE — 77417 THER RADIOLOGY PORT IMAGE(S): CPT | Performed by: RADIOLOGY

## 2017-04-05 ENCOUNTER — HOSPITAL ENCOUNTER (OUTPATIENT)
Dept: RADIATION ONCOLOGY | Facility: HOSPITAL | Age: 67
Setting detail: RADIATION/ONCOLOGY SERIES
Discharge: HOME | End: 2017-04-05

## 2017-04-05 PROCEDURE — 77412 RADIATION TX DELIVERY LVL 3: CPT | Performed by: RADIOLOGY

## 2017-04-06 ENCOUNTER — HOSPITAL ENCOUNTER (OUTPATIENT)
Dept: RADIATION ONCOLOGY | Facility: HOSPITAL | Age: 67
Setting detail: RADIATION/ONCOLOGY SERIES
Discharge: HOME | End: 2017-04-06

## 2017-04-06 PROCEDURE — 77336 RADIATION PHYSICS CONSULT: CPT | Performed by: RADIOLOGY

## 2017-04-06 PROCEDURE — 77412 RADIATION TX DELIVERY LVL 3: CPT | Performed by: RADIOLOGY

## 2017-04-10 ENCOUNTER — HOSPITAL ENCOUNTER (OUTPATIENT)
Dept: RADIATION ONCOLOGY | Facility: HOSPITAL | Age: 67
Setting detail: RADIATION/ONCOLOGY SERIES
Discharge: HOME | End: 2017-04-10

## 2017-04-10 PROCEDURE — 77412 RADIATION TX DELIVERY LVL 3: CPT | Performed by: RADIOLOGY

## 2017-04-11 ENCOUNTER — HOSPITAL ENCOUNTER (OUTPATIENT)
Dept: RADIATION ONCOLOGY | Facility: HOSPITAL | Age: 67
Setting detail: RADIATION/ONCOLOGY SERIES
Discharge: HOME | End: 2017-04-11

## 2017-04-11 LAB
CYTO UR: NORMAL
DX PRELIMINARY: NORMAL
LAB AP CASE REPORT: NORMAL
Lab: NORMAL
Lab: NORMAL
PATH REPORT.FINAL DX SPEC: NORMAL
PATH REPORT.GROSS SPEC: NORMAL

## 2017-04-11 PROCEDURE — 77412 RADIATION TX DELIVERY LVL 3: CPT | Performed by: RADIOLOGY

## 2017-04-12 ENCOUNTER — HOSPITAL ENCOUNTER (OUTPATIENT)
Dept: RADIATION ONCOLOGY | Facility: HOSPITAL | Age: 67
Discharge: HOME | End: 2017-04-12

## 2017-04-12 ENCOUNTER — DOCUMENTATION (OUTPATIENT)
Dept: RADIATION ONCOLOGY | Facility: HOSPITAL | Age: 67
End: 2017-04-12

## 2017-04-12 DIAGNOSIS — M48.56XA PATHOLOGIC COMPRESSION FRACTURE OF LUMBAR VERTEBRA, INITIAL ENCOUNTER (HCC): Primary | ICD-10-CM

## 2017-04-12 DIAGNOSIS — C79.51 PAIN FROM BONE METASTASES (HCC): ICD-10-CM

## 2017-04-12 DIAGNOSIS — G89.3 PAIN FROM BONE METASTASES (HCC): ICD-10-CM

## 2017-04-12 DIAGNOSIS — C78.7 LIVER METASTASIS: ICD-10-CM

## 2017-04-12 PROBLEM — Z71.9 ENCOUNTER FOR CONSULTATION: Status: ACTIVE | Noted: 2017-04-12

## 2017-04-12 PROCEDURE — 77412 RADIATION TX DELIVERY LVL 3: CPT | Performed by: RADIOLOGY

## 2017-04-12 PROCEDURE — 77417 THER RADIOLOGY PORT IMAGE(S): CPT | Performed by: RADIOLOGY

## 2017-04-12 RX ORDER — MORPHINE SULFATE 30 MG/1
30 TABLET, FILM COATED, EXTENDED RELEASE ORAL 2 TIMES DAILY
Qty: 60 TABLET | Refills: 0 | Status: ON HOLD | OUTPATIENT
Start: 2017-04-12 | End: 2017-04-17

## 2017-04-12 RX ORDER — HYDROMORPHONE HYDROCHLORIDE 8 MG/1
8 TABLET ORAL EVERY 4 HOURS PRN
Qty: 90 TABLET | Refills: 0 | Status: SHIPPED | OUTPATIENT
Start: 2017-04-12

## 2017-04-12 NOTE — PROGRESS NOTES
[facility] Gisel Beltran BSN, PA-C  ________________________________________  [facility]  Department of Radiation Oncology  [address] [place] [id number]  Office:  [phone]  Fax: [phone]      DATE:  03/30/2017    PATIENT:   Mahamed Greene 1950                                 MEDICAL RECORD #:  [id number]                                                       REASON FOR CONSULTATION:      1. Pain from bone metastases    2. Pathologic compression fracture of lumbar vertebra, initial encounter    3. Weakness of both lower extremities    4. Liver metastasis    5. Encounter for consultation         Brief History: Mahamed Greene is a very pleasant male that has been discharged home today from the hospital with an unfortunate diagnosis of probable lung cancer with widespread metastasis.  Mr Greene was initially evaluated 3/20/2017 for new findings including a 4.3 cm RLL lung mass with significant mediastinal adenopathy and several likely metastatic liver and bone lesions, initiated on Decadron and work-up initiated with priority given to MRI of the brain and thoracic/lumbar spine regarding headache and lower extremity weakness with concern for spinal cord compression. MRI of the lumbar spine was completed 3/20/17 at  and showed numerous metastatic lesions to the skeleton, the largest visualized lesion involving L1 with approximately 40% replacement of marrow, with questionable superior endplate deformity suspicious for nondisplaced pathologic fracture. Additional lesions are visualized in bilateral iliac bones and involving all visualized vertebral bodies. Dr. Rosales admitted Mr. Greene for completion of work-up and biopsy for tissue diagnosis as well as parenteral medications regarding uncontrolled pain and nausea. Consultation will be placed to neurosurgery regarding possible pathologic compression  fracture, he was found to have a large right lower lobular nodule measuring 4.3 cm, multiple mediastinal adenopathy, Liver lesions and the already mentioned bone lesions.  His daughter came down to our office yesterday, she wanted to talk with him before to see what he wanted to do since the extent of the disease is severe. I went by his room yesterday evening to give a brief summary of radiation therapy and he said he would let us know what he wanted to do once he spoke to Dr. Leonard Nazario regarding surgery last evening. He is being discharged home today and he requested to be set up for the palliative radiation treatment course since he is not a surgical candidate.       · 3/1/2017-he presented to the ED at [facility] with complaint of chest pain and the new onset dry cough for 3 weeks. He had associated weight loss of about 13 pounds over the last couple months.   · 3/1/2017-CT PE protocol and a CT of the abdomen/pelvis with contrast was performed. There was no evidence of pulmonary embolism. However, it showed a large lobular nodule measuring 4.3 cm in size in the right lower lobe. 11 mm nodule in the right posterior costophrenic angle within the right lower lobe. 4.3 cm right hilar lymph node. 2.4 cm right infrahilar nodes. 2.4 and 2.9 cm subcarinal node. Pretracheal node measuring 2.7 cm. 2 enlarged AP window lymph nodes measure 1.8 and 1.6 cm. Multiple right paratracheal nodes measuring 2.3 and 1.9 cm as well as a 2.4 cm high right paratracheal node. Emphysematous changes. Peripheral 10 mm nodes within the right upper lobe. Multiple liver lesions likely consistent metastatic disease. Lytic lesion involving the L1 pedicle with associated left-sided foraminal encroachment related to the soft tissue complement.  · 3/20/17- MRI of the lumbar spine at  and showed numerous metastatic lesions to the skeleton, the largest visualized lesion involving L1 with approximately 40% replacement of marrow, with  questionable superior endplate deformity suspicious for nondisplaced pathologic fracture. Additional lesions are visualized in bilateral iliac bones and involving all visualized vertebral bodies  · MRI of the thoracic spine on 3/28/17 showed multiple metastatic foci within the thoracic/lumbar spine. Schmorl's nodes are noted at T8-T12. Metastatic lesions are noted at T3, T6,T7, T9, T12. Most extensive involvement is noted at the L1 vertebral level. There is no evidence of associated pathologic fracture or compromise of the spinal canal, or evidence of drop metastasis.   · MRI of the brain on 3/28/17 showed no evidence of acute ischemia, infarct and specifically no evidence of metastatic disease  · MRI cervical spine on 03/29/17 revealed stenosis C2-3, C3-4 though NO evidence of metastatic disease.  · Bone scan on 03/29/17 showed abnormal uptake at T8, T10 and L1. Additionally noted was uptake in the left 3rd and 5th ribs, left humeral head & mid-distal shaft of the left humerus. Metastatic disease noted in the left femur in the region of the lesser trochanter and in the proximal shaft of the right femur.   · CT guided liver biopsy performed 03/29/17 at [facility]. Pathology pending.     ONCOLOGY HISTORY   No matching staging information was found for the patient.     History obtained from  PATIENT, FAMILY and CHART     PAST MEDICAL HISTORY   Past Medical History:   Diagnosis Date   • Cancer    • Chronic pain    • COPD (chronic obstructive pulmonary disease)    • GERD (gastroesophageal reflux disease)    • Hypertension       PAST SURGICAL HISTORY   No past surgical history on file.     SOCIAL HISTORY   Social History   Substance Use Topics   • Smoking status: Former Smoker     Packs/day: 1.00     Types: Cigarettes     Quit date: 3/28/2016   • Smokeless tobacco: Not on file   • Alcohol use No     ALLERGIES   Fentanyl     MEDICATIONS   Current Outpatient Prescriptions   Medication Sig Dispense Refill   • albuterol (PROVENTIL)  (2.5 MG/3ML) 0.083% nebulizer solution Take 2.5 mg by nebulization Every 4 (Four) Hours As Needed for wheezing.     • ALPRAZolam (XANAX) 1 MG tablet Take 1 tablet by mouth Every 12 (Twelve) Hours As Needed for Anxiety. At HS 30 tablet 0   • cyclobenzaprine (FLEXERIL) 10 MG tablet Take 10 mg by mouth 3 (Three) Times a Day.     • DULoxetine (CYMBALTA) 60 MG capsule Take 120 mg by mouth Daily.     • gabapentin (NEURONTIN) 300 MG capsule Take 300 mg by mouth Daily. At HS     • HYDROmorphone (DILAUDID) 8 MG tablet Take 1 tablet by mouth Every 4 (Four) Hours As Needed for Moderate Pain (4-6). 90 tablet 0   • losartan (COZAAR) 100 MG tablet Take 50 mg by mouth Daily.     • melatonin 3 MG tablet Take 1 tablet by mouth Daily. At HS 30 tablet 0   • Morphine (MS CONTIN) 30 MG 12 hr tablet Take 1 tablet by mouth 2 (Two) Times a Day. 60 tablet 0   • nabumetone (RELAFEN) 750 MG tablet Take 750 mg by mouth 2 (Two) Times a Day.     • pantoprazole (PROTONIX) 40 MG EC tablet Take 40 mg by mouth Every Morning.     • polyethylene glycol (MIRALAX) packet Take 17 g by mouth Daily. At HS     • tiotropium (SPIRIVA) 18 MCG per inhalation capsule Place 1 capsule into inhaler and inhale Daily.     • traZODone (DESYREL) 100 MG tablet Take 50 mg by mouth Every Night.       No current facility-administered medications for this visit.         The following portions of the patient's history were reviewed and updated as appropriate: allergies, current medications, past family history, past medical history, past social history, past surgical history and problem list.    REVIEW OF SYSTEMS   Review of Systems   General: positive for - fatigue   ENT: negative for - oral lesions  Allergy and Immunology: negative   Hematological and Lymphatic: positive for - weight loss  Respiratory: positive for - cough, painful inspiration  Cardiovascular: negative for - chest pain or palpitations  Gastrointestinal: positive for - nausea, epigastric discomfort,  improved this a.m.  Genito-Urinary: negative for - dysuria or hematuria  Musculoskeletal: positive for - generalized weakness  Neurological: positive for - headache, bilateral lower extremity and left upper extremity weakness  Dermatological: negative for pruritus and rash     PHYSICAL EXAM  Vital Signs  Temp: [97.3 °F (36.3 °C)-98 °F (36.7 °C)] 97.3 °F (36.3 °C)  Heart Rate: [83-91] 86  Resp: [16-18] 18  BP: ()/(66-84) 103/79    General Appearance: Alert, cooperative, in no acute distress  Head: Normocephalic, without obvious abnormality, atraumatic  Eyes: Normal conjunctivae and sclerae normal, anicteric, no pallor, corneas clear  Ears: Ears appear intact with no abnormalities noted, hearing grossly normal  Throat: No oral lesions, no thrush, oral mucosa moist  Neck: No adenopathy, supple, trachea midline, no JVD  Lungs: Clear to auscultation, respirations regular, even and unlabored  Heart: Regular rhythm and normal rate, no murmur, no gallop, no rub, no click  Abdomen: Normal bowel sounds, no masses, no organomegaly, soft non-tender, non-distended, no guarding, no rebound tenderness  Genitalia: Deferred  Extremities: Moves all extremities well, no edema, no cyanosis, no redness  Skin: No bleeding, bruising or rash  Lymph nodes: No palpable adenopathy  Neurologic: Grossly intact though not formally tested  Psychiatric: Mood and affect are appropriate.    PERTINENT LABS and IMAGING  Admission on 03/28/2017, Discharged on 03/30/2017   Component Date Value Ref Range Status   • PTT 03/28/2017 30.1  24.1 - 34.8 seconds Final   • Protime 03/28/2017 13.7  11.9 - 14.6 Seconds Final   • INR 03/28/2017 1.02  0.91 - 1.09 Final   • Glucose 03/28/2017 101* 70 - 100 mg/dL Final   • BUN 03/28/2017 31* 5 - 21 mg/dL Final   • Creatinine 03/28/2017 1.02  0.50 - 1.40 mg/dL Final   • Sodium 03/28/2017 136  135 - 145 mmol/L Final   • Potassium 03/28/2017 4.3  3.5 - 5.3 mmol/L Final   • Chloride 03/28/2017 96* 98 - 110 mmol/L  Final   • CO2 03/28/2017 28.0  24.0 - 31.0 mmol/L Final   • Calcium 03/28/2017 9.9  8.4 - 10.4 mg/dL Final   • Total Protein 03/28/2017 7.4  6.3 - 8.7 g/dL Final   • Albumin 03/28/2017 3.60  3.50 - 5.00 g/dL Final   • ALT (SGPT) 03/28/2017 26  0 - 54 U/L Final   • AST (SGOT) 03/28/2017 44  7 - 45 U/L Final   • Alkaline Phosphatase 03/28/2017 204* 24 - 120 U/L Final   • Total Bilirubin 03/28/2017 0.5  0.1 - 1.0 mg/dL Final   • eGFR Non African Amer 03/28/2017 73  >60 mL/min/1.73 Final   • Globulin 03/28/2017 3.8  gm/dL Final   • A/G Ratio 03/28/2017 0.9* 1.1 - 2.5 g/dL Final   • BUN/Creatinine Ratio 03/28/2017 30.4* 7.0 - 25.0 Final   • Anion Gap 03/28/2017 12.0  4.0 - 13.0 mmol/L Final   • WBC 03/28/2017 22.11* 4.80 - 10.80 10*3/mm3 Final   • RBC 03/28/2017 4.16* 4.80 - 5.90 10*6/mm3 Final   • Hemoglobin 03/28/2017 10.6* 14.0 - 18.0 g/dL Final   • Hematocrit 03/28/2017 33.7* 40.0 - 52.0 % Final   • MCV 03/28/2017 81.0* 82.0 - 95.0 fL Final   • MCH 03/28/2017 25.5* 28.0 - 32.0 pg Final   • MCHC 03/28/2017 31.5* 33.0 - 36.0 g/dL Final   • RDW 03/28/2017 16.6* 12.0 - 15.0 % Final   • RDW-SD 03/28/2017 47.7  40.0 - 54.0 fl Final   • MPV 03/28/2017 9.8  6.0 - 12.0 fL Final   • Platelets 03/28/2017 277  130 - 400 10*3/mm3 Final   • Case Report 03/29/2017    Corrected                    Value:Medical Cytology Report                           Case: [id number]                                 Authorizing Provider:  Messi Rosales MD    Collected:           03/29/2017 06:23 AM          Ordering Location:     [address]  Received:            03/29/2017 01:39 PM          Pathologist:           Ignacio Zee MD                                                    Specimen:    Liver, FNA Liver                                                                          • Final Diagnosis 03/29/2017    Final                    Value:This result contains rich text formatting which cannot be displayed here.    • Preliminary Diagnosis 03/29/2017    Final                    Value:This result contains rich text formatting which cannot be displayed here.   • Gross Description 03/29/2017    Final                    Value:This result contains rich text formatting which cannot be displayed here.   • Intraoperative Consultation 03/29/2017    Final                    Value:This result contains rich text formatting which cannot be displayed here.   • Microscopic Description 03/29/2017    Final                    Value:This result contains rich text formatting which cannot be displayed here.   Admission on 03/01/2017, Discharged on 03/01/2017   Component Date Value Ref Range Status   • Glucose 03/01/2017 109* 70 - 100 mg/dL Final   • BUN 03/01/2017 16  5 - 21 mg/dL Final   • Creatinine 03/01/2017 1.18  0.50 - 1.40 mg/dL Final   • Sodium 03/01/2017 136  135 - 145 mmol/L Final   • Potassium 03/01/2017 4.1  3.5 - 5.3 mmol/L Final   • Chloride 03/01/2017 95* 98 - 110 mmol/L Final   • CO2 03/01/2017 30.0  24.0 - 31.0 mmol/L Final   • Calcium 03/01/2017 9.7  8.4 - 10.4 mg/dL Final   • Total Protein 03/01/2017 8.0  6.3 - 8.7 g/dL Final   • Albumin 03/01/2017 3.70  3.50 - 5.00 g/dL Final   • ALT (SGPT) 03/01/2017 19  0 - 54 U/L Final   • AST (SGOT) 03/01/2017 24  7 - 45 U/L Final   • Alkaline Phosphatase 03/01/2017 180* 24 - 120 U/L Final   • Total Bilirubin 03/01/2017 0.7  0.1 - 1.0 mg/dL Final   • eGFR Non African Amer 03/01/2017 62  >60 mL/min/1.73 Final   • Globulin 03/01/2017 4.3  gm/dL Final   • A/G Ratio 03/01/2017 0.9* 1.1 - 2.5 g/dL Final   • BUN/Creatinine Ratio 03/01/2017 13.6  7.0 - 25.0 Final   • Anion Gap 03/01/2017 11.0  4.0 - 13.0 mmol/L Final   • Protime 03/01/2017 13.8  11.9 - 14.6 Seconds Final   • INR 03/01/2017 1.03  0.91 - 1.09 Final   • PTT 03/01/2017 38.3* 24.1 - 34.8 seconds Final   • proBNP 03/01/2017 242.0  0.0 - 900.0 pg/mL Final   • Amylase 03/01/2017 45  30 - 110 U/L Final   • Lipase 03/01/2017 18* 23 - 203  U/L Final   • D-Dimer, Quantitative 03/01/2017 1.77* 0.00 - 0.50 mg/L (FEU) Final   • Influenza A Ag, EIA 03/01/2017 Negative  Negative Final   • Influenza B Ag, EIA 03/01/2017 Negative  Negative Final   • WBC 03/01/2017 11.36* 4.80 - 10.80 10*3/mm3 Final   • RBC 03/01/2017 4.05* 4.80 - 5.90 10*6/mm3 Final   • Hemoglobin 03/01/2017 10.5* 14.0 - 18.0 g/dL Final   • Hematocrit 03/01/2017 33.0* 40.0 - 52.0 % Final   • MCV 03/01/2017 81.5* 82.0 - 95.0 fL Final   • MCH 03/01/2017 25.9* 28.0 - 32.0 pg Final   • MCHC 03/01/2017 31.8* 33.0 - 36.0 g/dL Final   • RDW 03/01/2017 14.3  12.0 - 15.0 % Final   • RDW-SD 03/01/2017 43.3  40.0 - 54.0 fl Final   • MPV 03/01/2017 9.3  6.0 - 12.0 fL Final   • Platelets 03/01/2017 241  130 - 400 10*3/mm3 Final   • Neutrophil % 03/01/2017 74.2  39.0 - 78.0 % Final   • Lymphocyte % 03/01/2017 11.0* 15.0 - 45.0 % Final   • Monocyte % 03/01/2017 9.9  4.0 - 12.0 % Final   • Eosinophil % 03/01/2017 1.4  0.0 - 4.0 % Final   • Basophil % 03/01/2017 0.5  0.0 - 2.0 % Final   • Immature Grans % 03/01/2017 3.0  0.0 - 5.0 % Final   • Neutrophils, Absolute 03/01/2017 8.42* 1.87 - 8.40 10*3/mm3 Final   • Lymphocytes, Absolute 03/01/2017 1.25  0.72 - 4.86 10*3/mm3 Final   • Monocytes, Absolute 03/01/2017 1.13  0.19 - 1.30 10*3/mm3 Final   • Eosinophils, Absolute 03/01/2017 0.16  0.00 - 0.70 10*3/mm3 Final   • Basophils, Absolute 03/01/2017 0.06  0.00 - 0.20 10*3/mm3 Final   • Immature Grans, Absolute 03/01/2017 0.34* 0.00 - 0.03 10*3/mm3 Final   • Troponin I 03/01/2017 0.00  0.00 - 0.07 ng/mL Final    Serial Number: [id number]    : [id number]   • Troponin I 03/01/2017 0.00  0.00 - 0.07 ng/mL Final    Serial Number: [id number]    : [id number]     Mri Brain With & Without Contrast  Result Date: 3/28/2017  1. Atrophy with mild small vessel ischemic disease. 2. No evidence of restricted diffusion to suggest acute ischemia or infarct. 3. No evidence of mass lesion or abnormal contrast enhancement to  suggest metastatic disease to the brain. This report was finalized on 03/28/2017 21:26 by Dr. Ruben Wooten MD.    Mri Cervical Spine With & Without Contrast  Result Date: 3/29/2017  1. No evidence of metastatic disease to the cervical spine. 2. Multilevel degenerative changes. 3. Postoperative changes. This report was finalized on 03/29/2017 16:27 by Dr. Ivet Saavedra MD.    Mri Thoracic Spine With & Without Contrast  Result Date: 3/28/2017  1. Multiple metastatic foci within the thoracic and lumbar spine as described above. The most extensive involvement is noted at the L1 vertebral level. I do not see any associated pathologic fracture or compromise of the spinal canal. 2. There is some mild bulging of the discs within the mid and lower thoracic spine and upper lumbar column. There is mild associated central narrowing of the canal at T11-T12. There is no evidence of contact with or contouring of the cord. The thoracic cord is normal in caliber with no evidence of abnormal T2 signal or expansile mass. This report was finalized on 03/28/2017 20:22 by Dr. Ruben Wooten MD.    Nm Bone Scan Whole Body  Result Date: 3/29/2017  Metastatic bone disease, as described. This report was finalized on 03/29/2017 15:30 by Dr. Subhash Ignacio MD.    Ct Abdomen Pelvis With Contrast  Result Date: 3/1/2017  1. There is a peripheral irregular nodule within the right lower lobe with a smaller nodule within the right posterior costophrenic angle. There is associated right hilar and subcarinal lymphadenopathy consistent with a neoplastic process. Multiple liver metastases are present. There are also noted to be several lytic metastases within the lumbar spine and pelvis, as described above. This includes a lesion involving the left L1 pedicle with some associated left foraminal encroachment. 2. Mild constipation with increased stool within the right and transverse colon. There is no evidence of mechanical obstruction. No  localized inflammatory process is identified within the abdomen or pelvis. 3. A small fat-containing periumbilical hernia. 4. The patient has undergone previous endovascular stent graft placement with no evidence of complications and contraction of the patient's aortic aneurysm. This report was finalized on 03/01/2017 19:07 by Dr. Ruben Wooten MD.    Xr Chest 1 View  Result Date: 3/1/2017  1. Possibly chronic interstitial opacities in bilateral lungs. Findings do not appear significantly changed since 02/15/2016, and there is no focal consolidation.   This report was finalized on 03/01/2017 16:25 by Dr. Jf La MD.    Ct Needle Biopsy Liver  Result Date: 3/29/2017  CT-guided biopsy was performed of one of the nodules in the right hepatic lobe of the liver. There were no immediate complications. This report was finalized on 03/29/2017 13:20 by Dr. Subhash Ignacio MD.    Ct Angiogram Chest With Contrast  Result Date: 3/1/2017  1. There is a lobular mass within the periphery of the right lower lobe. A smaller nodule is noted within the right posterior costophrenic angle, as well as within the periphery of the right upper lobe. There is associated right hilar and rather extensive mediastinal lymphadenopathy. There is evidence of both bony and hepatic metastases. 2. No evidence of pulmonary thromboembolic disease, with normal enhancement of the pulmonary arteries. The thoracic aorta is normal in caliber with no dissection or aneurysm. 3. Coronary artery calcifications in the LAD and circumflex distributions. This report was finalized on 03/01/2017 19:07 by Dr. Ruben Wooten MD.    Xr Humerus 2 View Bilateral  Result Date: 3/30/2017  1. Lytic lesion in the distal humeral diaphysis on the LEFT is consistent with osseous metastatic disease. 2. Questionable smaller lucent lesion in the more proximal LEFT humeral diaphysis could be artifactual or could represent a very small lytic lesion at this location. It  should be noted that this lesion was not hot on the nuclear medicine bone scan. This could be related to its small size, they could be no bone turnover at this location, or this second lesion could be artifactual. This report was finalized on 03/30/2017 09:27 by Dr. Kevin Pro MD.      Clinical Quality Measures   Pain Documented PQRS #131 Pain severity quantified; pain present, followup plan documented.   Care Plan: ADVANCED CARE PQRS #47 Care plan discussed, no care plan provided   Performance Status: ECOG (2) Ambulatory and capable of self care, unable to carry out work activity, up and about > 50% or waking hours   TOBACCO SCREENING AND INTERVENTION  PQRS #226 Screened & identified as tobacco non-user. Former smoker    Medications Documented PQRS #130 Medications documented   WEIGHT SCREENING/BMI  Calculated BMI within normal parameteres & documented    ASSESSMENT AND PLAN   1. Pain from bone metastases    2. Pathologic compression fracture of lumbar vertebra, initial encounter    3. Weakness of both lower extremities    4. Liver metastasis    5. Encounter for consultation         No orders of the defined types were placed in this encounter.    RECOMMENDATIONS:   Indications and rationale of palliative radiation therapy according to the NCCN Guidelines for painful bony mets to the spine, bilateral shoulders and sacrum has been discussed. I have extensively reviewed the risks, benefits and alternatives of therapy and progression of disease in spite of therapy with either local or systemic failure.  I am recommending radiation therapy which will consist of 10 treatment fractions for total of 3000 cGy to each area.  The patient verbalizes understanding of this discussion and voices no further questions and wishes to proceed with recommended therapy.  We will proceed with CT simulation today to initiate the treatment planning and notify the patient when complete to begin. Return for initiation of treatment when  plan completed..    Today, total time spent with this patient was 60  minutes and of that time, well over 50% was spent in counselling and coordination of care as follows: diagnosis, intent of treatment (curative, palliative, or prophylactic) discussing radiation therapy specifics: logistics, possible and probable side effects and after effects, staging of cancer, standard of care in for this stage of this cancer and treatment options  Michelle Chery PA-C for Dr. Murali Sloan  03/30/2017    EMR Dragon/Transcription Disclaimer:  Much of this encounter note is an electronic transcription/translation of spoken language to printed text. The electronic translation of spoken language may permit erroneous, or at times, nonsensical words or phrases to be inadvertently transcribed; although I have reviewed the note for such errors, some may still exist.

## 2017-04-13 ENCOUNTER — HOSPITAL ENCOUNTER (OUTPATIENT)
Dept: RADIATION ONCOLOGY | Facility: HOSPITAL | Age: 67
Setting detail: RADIATION/ONCOLOGY SERIES
Discharge: HOME | End: 2017-04-13

## 2017-04-13 PROCEDURE — 77412 RADIATION TX DELIVERY LVL 3: CPT | Performed by: RADIOLOGY

## 2017-04-17 ENCOUNTER — APPOINTMENT (OUTPATIENT)
Dept: MRI IMAGING | Facility: HOSPITAL | Age: 67
End: 2017-04-17

## 2017-04-17 ENCOUNTER — HOSPITAL ENCOUNTER (INPATIENT)
Facility: HOSPITAL | Age: 67
LOS: 3 days | Discharge: HOSPICE | End: 2017-04-20
Attending: EMERGENCY MEDICINE | Admitting: INTERNAL MEDICINE

## 2017-04-17 ENCOUNTER — APPOINTMENT (OUTPATIENT)
Dept: GENERAL RADIOLOGY | Facility: HOSPITAL | Age: 67
End: 2017-04-17

## 2017-04-17 DIAGNOSIS — J18.9 PNEUMONIA OF BOTH LUNGS DUE TO INFECTIOUS ORGANISM, UNSPECIFIED PART OF LUNG: Primary | ICD-10-CM

## 2017-04-17 DIAGNOSIS — R09.02 HYPOXIA: ICD-10-CM

## 2017-04-17 DIAGNOSIS — R52 INTRACTABLE PAIN: Primary | ICD-10-CM

## 2017-04-17 DIAGNOSIS — C79.51 PAIN FROM BONE METASTASES (HCC): ICD-10-CM

## 2017-04-17 DIAGNOSIS — G89.3 PAIN FROM BONE METASTASES (HCC): ICD-10-CM

## 2017-04-17 DIAGNOSIS — R29.898 WEAKNESS OF BOTH LOWER EXTREMITIES: ICD-10-CM

## 2017-04-17 DIAGNOSIS — M48.56XA PATHOLOGIC COMPRESSION FRACTURE OF LUMBAR VERTEBRA, INITIAL ENCOUNTER (HCC): ICD-10-CM

## 2017-04-17 DIAGNOSIS — R29.6 FALLING EPISODES: ICD-10-CM

## 2017-04-17 DIAGNOSIS — R50.9 FEVER IN ADULT: ICD-10-CM

## 2017-04-17 DIAGNOSIS — R09.89 CHEST CONGESTION: ICD-10-CM

## 2017-04-17 PROBLEM — C79.52 SECONDARY MALIGNANCY OF BONE MARROW (HCC): Status: ACTIVE | Noted: 2017-04-17

## 2017-04-17 LAB
ALBUMIN SERPL-MCNC: 2.8 G/DL (ref 3.5–5)
ALBUMIN/GLOB SERPL: 0.8 G/DL (ref 1.1–2.5)
ALP SERPL-CCNC: 159 U/L (ref 24–120)
ALT SERPL W P-5'-P-CCNC: 23 U/L (ref 0–54)
ANION GAP SERPL CALCULATED.3IONS-SCNC: 13 MMOL/L (ref 4–13)
ARTERIAL PATENCY WRIST A: ABNORMAL
AST SERPL-CCNC: 38 U/L (ref 7–45)
ATMOSPHERIC PRESS: ABNORMAL MMHG
BASE EXCESS BLDA CALC-SCNC: 3.2 MMOL/L (ref -2–2)
BASOPHILS # BLD AUTO: 0.01 10*3/MM3 (ref 0–0.2)
BASOPHILS NFR BLD AUTO: 0.2 % (ref 0–2)
BDY SITE: ABNORMAL
BILIRUB SERPL-MCNC: 0.5 MG/DL (ref 0.1–1)
BILIRUB UR QL STRIP: ABNORMAL
BUN BLD-MCNC: 41 MG/DL (ref 5–21)
BUN/CREAT SERPL: 33.1 (ref 7–25)
CALCIUM SPEC-SCNC: 10.5 MG/DL (ref 8.4–10.4)
CHLORIDE SERPL-SCNC: 100 MMOL/L (ref 98–110)
CLARITY UR: ABNORMAL
CO2 SERPL-SCNC: 25 MMOL/L (ref 24–31)
COLOR UR: ABNORMAL
CREAT BLD-MCNC: 1.24 MG/DL (ref 0.5–1.4)
D-LACTATE SERPL-SCNC: 1.2 MMOL/L (ref 0.5–2)
DEPRECATED RDW RBC AUTO: 53.6 FL (ref 40–54)
EOSINOPHIL # BLD AUTO: 0.08 10*3/MM3 (ref 0–0.7)
EOSINOPHIL NFR BLD AUTO: 1.3 % (ref 0–4)
ERYTHROCYTE [DISTWIDTH] IN BLOOD BY AUTOMATED COUNT: 18.2 % (ref 12–15)
GAS FLOW AIRWAY: 2 LPM
GFR SERPL CREATININE-BSD FRML MDRD: 59 ML/MIN/1.73
GLOBULIN UR ELPH-MCNC: 3.5 GM/DL
GLUCOSE BLD-MCNC: 123 MG/DL (ref 70–100)
GLUCOSE UR STRIP-MCNC: NEGATIVE MG/DL
HCO3 BLDA-SCNC: 26 MMOL/L (ref 22–26)
HCT VFR BLD AUTO: 28.7 % (ref 40–52)
HGB BLD-MCNC: 9.3 G/DL (ref 14–18)
HGB UR QL STRIP.AUTO: NEGATIVE
HOLD SPECIMEN: NORMAL
HOLD SPECIMEN: NORMAL
IMM GRANULOCYTES # BLD: 0.12 10*3/MM3 (ref 0–0.03)
IMM GRANULOCYTES NFR BLD: 1.9 % (ref 0–5)
KETONES UR QL STRIP: NEGATIVE
LEUKOCYTE ESTERASE UR QL STRIP.AUTO: NEGATIVE
LYMPHOCYTES # BLD AUTO: 0.39 10*3/MM3 (ref 0.72–4.86)
LYMPHOCYTES NFR BLD AUTO: 6.2 % (ref 15–45)
MCH RBC QN AUTO: 26.3 PG (ref 28–32)
MCHC RBC AUTO-ENTMCNC: 32.4 G/DL (ref 33–36)
MCV RBC AUTO: 81.1 FL (ref 82–95)
MODALITY: ABNORMAL
MONOCYTES # BLD AUTO: 0.75 10*3/MM3 (ref 0.19–1.3)
MONOCYTES NFR BLD AUTO: 12 % (ref 4–12)
NEUTROPHILS # BLD AUTO: 4.91 10*3/MM3 (ref 1.87–8.4)
NEUTROPHILS NFR BLD AUTO: 78.4 % (ref 39–78)
NITRITE UR QL STRIP: NEGATIVE
NT-PROBNP SERPL-MCNC: 653 PG/ML (ref 0–900)
PCO2 BLDA: 34.2 MM HG (ref 35–45)
PH BLDA: 7.5 PH UNITS (ref 7.35–7.45)
PH UR STRIP.AUTO: <=5 [PH] (ref 5–8)
PLATELET # BLD AUTO: 114 10*3/MM3 (ref 130–400)
PMV BLD AUTO: 9.6 FL (ref 6–12)
PO2 BLDA: 65.2 MM HG (ref 80–100)
POTASSIUM BLD-SCNC: 5.2 MMOL/L (ref 3.5–5.3)
PROT SERPL-MCNC: 6.3 G/DL (ref 6.3–8.7)
PROT UR QL STRIP: ABNORMAL
RBC # BLD AUTO: 3.54 10*6/MM3 (ref 4.8–5.9)
SAO2 % BLDCOA: 94.5 % (ref 94–100)
SAO2 % BLDCOA: 94.5 % (ref 94–100)
SODIUM BLD-SCNC: 138 MMOL/L (ref 135–145)
SP GR UR STRIP: 1.02 (ref 1–1.03)
UROBILINOGEN UR QL STRIP: ABNORMAL
WBC NRBC COR # BLD: 6.26 10*3/MM3 (ref 4.8–10.8)
WHOLE BLOOD HOLD SPECIMEN: NORMAL
WHOLE BLOOD HOLD SPECIMEN: NORMAL

## 2017-04-17 PROCEDURE — 71010 HC CHEST PA OR AP: CPT

## 2017-04-17 PROCEDURE — 80053 COMPREHEN METABOLIC PANEL: CPT | Performed by: EMERGENCY MEDICINE

## 2017-04-17 PROCEDURE — 82803 BLOOD GASES ANY COMBINATION: CPT

## 2017-04-17 PROCEDURE — 25010000002 AZITHROMYCIN: Performed by: EMERGENCY MEDICINE

## 2017-04-17 PROCEDURE — 93010 ELECTROCARDIOGRAM REPORT: CPT | Performed by: INTERNAL MEDICINE

## 2017-04-17 PROCEDURE — 81003 URINALYSIS AUTO W/O SCOPE: CPT | Performed by: EMERGENCY MEDICINE

## 2017-04-17 PROCEDURE — 25010000002 CEFTRIAXONE: Performed by: EMERGENCY MEDICINE

## 2017-04-17 PROCEDURE — 87040 BLOOD CULTURE FOR BACTERIA: CPT | Performed by: EMERGENCY MEDICINE

## 2017-04-17 PROCEDURE — 36600 WITHDRAWAL OF ARTERIAL BLOOD: CPT

## 2017-04-17 PROCEDURE — 83880 ASSAY OF NATRIURETIC PEPTIDE: CPT | Performed by: EMERGENCY MEDICINE

## 2017-04-17 PROCEDURE — 25010000002 LORAZEPAM PER 2 MG: Performed by: EMERGENCY MEDICINE

## 2017-04-17 PROCEDURE — 93005 ELECTROCARDIOGRAM TRACING: CPT | Performed by: EMERGENCY MEDICINE

## 2017-04-17 PROCEDURE — 36415 COLL VENOUS BLD VENIPUNCTURE: CPT | Performed by: EMERGENCY MEDICINE

## 2017-04-17 PROCEDURE — 72148 MRI LUMBAR SPINE W/O DYE: CPT

## 2017-04-17 PROCEDURE — 83605 ASSAY OF LACTIC ACID: CPT | Performed by: INTERNAL MEDICINE

## 2017-04-17 PROCEDURE — 85025 COMPLETE CBC W/AUTO DIFF WBC: CPT | Performed by: EMERGENCY MEDICINE

## 2017-04-17 PROCEDURE — 25010000002 MORPHINE PER 10 MG: Performed by: INTERNAL MEDICINE

## 2017-04-17 PROCEDURE — 72146 MRI CHEST SPINE W/O DYE: CPT

## 2017-04-17 PROCEDURE — 99285 EMERGENCY DEPT VISIT HI MDM: CPT

## 2017-04-17 PROCEDURE — 25010000002 PIPERACILLIN SOD-TAZOBACTAM PER 1 G: Performed by: INTERNAL MEDICINE

## 2017-04-17 RX ORDER — SODIUM CHLORIDE 0.9 % (FLUSH) 0.9 %
10 SYRINGE (ML) INJECTION AS NEEDED
Status: DISCONTINUED | OUTPATIENT
Start: 2017-04-17 | End: 2017-04-20 | Stop reason: HOSPADM

## 2017-04-17 RX ORDER — SODIUM CHLORIDE 9 MG/ML
125 INJECTION, SOLUTION INTRAVENOUS CONTINUOUS
Status: DISCONTINUED | OUTPATIENT
Start: 2017-04-17 | End: 2017-04-20 | Stop reason: HOSPADM

## 2017-04-17 RX ORDER — LORAZEPAM 2 MG/ML
1 INJECTION INTRAMUSCULAR ONCE
Status: COMPLETED | OUTPATIENT
Start: 2017-04-17 | End: 2017-04-17

## 2017-04-17 RX ORDER — MORPHINE SULFATE 30 MG/1
30 TABLET, FILM COATED, EXTENDED RELEASE ORAL EVERY 12 HOURS
Status: ON HOLD | COMMUNITY
End: 2017-04-20

## 2017-04-17 RX ORDER — MORPHINE SULFATE 4 MG/ML
4 INJECTION, SOLUTION INTRAMUSCULAR; INTRAVENOUS
Status: DISCONTINUED | OUTPATIENT
Start: 2017-04-17 | End: 2017-04-18

## 2017-04-17 RX ORDER — HYDROCODONE BITARTRATE AND ACETAMINOPHEN 10; 325 MG/1; MG/1
2 TABLET ORAL EVERY 12 HOURS
COMMUNITY
End: 2017-04-20 | Stop reason: HOSPADM

## 2017-04-17 RX ORDER — ALBUTEROL SULFATE 2.5 MG/3ML
2.5 SOLUTION RESPIRATORY (INHALATION) EVERY 4 HOURS PRN
Status: DISCONTINUED | OUTPATIENT
Start: 2017-04-17 | End: 2017-04-20 | Stop reason: HOSPADM

## 2017-04-17 RX ORDER — LANOLIN ALCOHOL/MO/W.PET/CERES
3 CREAM (GRAM) TOPICAL NIGHTLY
COMMUNITY

## 2017-04-17 RX ORDER — ZOLPIDEM TARTRATE 10 MG/1
10 TABLET ORAL NIGHTLY PRN
COMMUNITY

## 2017-04-17 RX ORDER — ZOLPIDEM TARTRATE 5 MG/1
10 TABLET ORAL NIGHTLY PRN
Status: DISCONTINUED | OUTPATIENT
Start: 2017-04-17 | End: 2017-04-20 | Stop reason: HOSPADM

## 2017-04-17 RX ADMIN — LORAZEPAM 1 MG: 2 INJECTION INTRAMUSCULAR at 14:40

## 2017-04-17 RX ADMIN — TAZOBACTAM SODIUM AND PIPERACILLIN SODIUM 3.38 G: 375; 3 INJECTION, SOLUTION INTRAVENOUS at 20:10

## 2017-04-17 RX ADMIN — MORPHINE SULFATE 4 MG: 4 INJECTION, SOLUTION INTRAMUSCULAR; INTRAVENOUS at 23:20

## 2017-04-17 RX ADMIN — LORAZEPAM 1 MG: 2 INJECTION INTRAMUSCULAR; INTRAVENOUS at 15:10

## 2017-04-17 RX ADMIN — SODIUM CHLORIDE 500 ML: 9 INJECTION, SOLUTION INTRAVENOUS at 18:02

## 2017-04-17 RX ADMIN — CEFTRIAXONE 1 G: 1 INJECTION, POWDER, FOR SOLUTION INTRAMUSCULAR; INTRAVENOUS at 16:29

## 2017-04-17 RX ADMIN — SODIUM CHLORIDE 125 ML/HR: 9 INJECTION, SOLUTION INTRAVENOUS at 18:03

## 2017-04-17 RX ADMIN — AZITHROMYCIN 500 MG: 500 INJECTION, POWDER, LYOPHILIZED, FOR SOLUTION INTRAVENOUS at 15:50

## 2017-04-17 RX ADMIN — MORPHINE SULFATE 4 MG: 4 INJECTION, SOLUTION INTRAMUSCULAR; INTRAVENOUS at 20:11

## 2017-04-17 NOTE — PROGRESS NOTES
[facility]  MD Gisel Coughlin MSN, APRN, FNP-BC  Michelle Chery BSN, DOLORES  ___________________________________________________________  [facility]  Department of Radiation Oncology  [address] [place] [id number]  Office:  [phone]  Fax: [phone]        Patients daughter called very concerned about her father, he had multiple falling episodes this weekend, very congested with cough and shortness of breath. Dr Sloan changed his pain medication last week and his pain is still uncontrolled. Will order stat MRI thoracic and lumbar spine and CXR.  The daughterRosalia was on his way to get him at his house and depending on how he was, she may also just take him on to the ER.  Michelle Chery PA-C  9:58 AM

## 2017-04-17 NOTE — H&P
ADMISSION HISTORY & PHYSICAL    Pt Name: Mahamed Greene  MRN: [id number]  [id number]  YOB: 1950  Date of evaluation: 4/17/2017       HISTORY OF PRESENT ILLNESS:      Mahamed Greene is a 67 year old gentleman initially evaluated 3/20/2017 for new findings including a 4.3 cm RLL lung mass with significant mediastinal adenopathy and several likely metastatic liver and bone lesions.  Mr Greene presents for a follow-up after recent hospitalization where he had a CT-guided biopsy of the liver lesions. He also had MRI of the thoracic spine and cervical spine MRI of the brain and a bone scan for further staging of his disease. He was found to have diffuse metastatic disease to the spine without cord compression. He has been seen by Dr. Murali Sloan for palliative radiation to the sites of symptomatic bone disease. The biopsy of the liver lesion was consistent with squamous cell carcinoma poorly differentiated, lung origin PDl-1, 1%.    He is being admitted from the ER with worsening of his overall performance, recent falls at home, worsening mental status, hypotension and pneumonia. He had a fever of 101.7c.    Diagnosis  NSCLC, Poorly differentiated squamous cell carcinoma.   PDL1 - 1%  Treatment summary  Palliative RT bone lesions 7/13 treatements  Tumor target  large lobular nodule measuring 4.3 cm   Multiple mediastinal adenopathy.   Liver lesions   Bone lesions(L+T spine)  Lung mass-NSCLC, poorly differentiated SCC PDL-1 1%, stage IV  Mr Greene is a 67 years old male first seen by (Dr. Rosales) on 3/20/2017 further workup of a lung mass/mediastinal adenopathy and multiple liver and bone lesions.  3/1/2017-he presented to the ED at [facility] with complaints of chest pain and the new onset dry cough for 3 weeks. He had associated weight loss of about 13 pounds over the last couple months.   3/1/2017-CT PE protocol and a CT of the abdomen/pelvis with contrast was performed. There was no  evidence of pulmonary embolism. However, it showed a large lobular nodule measuring 4.3 cm in size in the right lower lobe. 11 mm nodule in the right posterior costophrenic angle within the right lower lobe. 4.3 cm right hilar lymph node. 2.4 cm right infrahilar nodes. 2.4 and 2.9 cm subcarinal node. Pretracheal node measuring 2.7 cm. 2 enlarged AP window lymph nodes measure 1.8 and 1.6 cm. Multiple right paratracheal nodes measuring 2.3 and 1.9 cm as well as a 2.4 cm high right paratracheal node. Emphysematous changes. Peripheral 10 mm nodes within the right upper lobe. Multiple liver lesions likely consistent metastatic disease. Lytic lesion involving the L1 pedicle with associated left-sided foraminal encroachment related to the soft tissue complement.   MRI of the lumbar spine was completed 3/20/17 at  and showed numerous metastatic lesions to the skeleton, the largest visualized lesion involving L1 with approximately 40% replacement of marrow, with questionable superior endplate deformity suspicious for nondisplaced pathologic fracture. Additional lesions are visualized in bilateral iliac bones and involving all visualized vertebral bodies.   3/28/2017-MRI of the thoracic spine- Multiple metastatic foci within the thoracic and lumbar spine as described above. The most extensive involvement is noted at the L1 vertebral level. I do not see any associated pathologic fracture or compromise of the spinal canal. 2. There is some mild bulging of the discs within the mid and lower thoracic spine and upper lumbar column. There is mild associated central   narrowing of the canal at T11-T12. There is no evidence of contact with or contouring of the cord. The thoracic cord is normal in caliber with no evidence of abnormal T2 signal or expansile mass.  3/28/2017- MRI of the brain with contrast- 1. Atrophy with mild small vessel ischemic disease. 2. No evidence of restricted diffusion to suggest acute ischemia or infarct.  3. No evidence of mass lesion or abnormal contrast enhancement to suggest metastatic disease to the brain.  3/28/2017- MRI of the cervical spine- No evidence of metastatic disease to the cervical spine. Multilevel degenerative changes.  3/29/2017-bone scan- There is abnormal uptake in the L1 vertebral body in the left pedicle region. There is a destructive bone lesion in this area on CT.  There is mild uptake in the right T10 pedicle. There is mild uptake in the left T8 transverse process. There is abnormal uptake in the left anterior third rib and the left lateral fifth rib. There is probable degenerative uptake in the right AC joint. There is abnormal uptake in the left humeral head and in the mid to distal shaft of the left humerus. There is abnormal uptake in the proximal left femur in the region of the lesser trochanter. There is abnormal uptake in the proximal shaft of the right femur. There is absence of activity along the superior margin of the right iliac wing. There does appear to be a destructive lesion in this location on the CT images.    03/29/2017- CT guided liver biopsy- Metastatic Non-small cell carcinoma, with immunohistochemical features consistent with poorly differentiated squamous cell carcinoma. PDL1- 1.%    3/30/2017- x-ray humerus bilaterally-Lytic lesion in the distal humeral diaphysis on the LEFT is consistent with osseous metastatic disease.2. Questionable smaller lucent lesion in the more proximal LEFT humeral diaphysis could be artifactual or could represent a very small lytic lesion at this location. It should be noted that this lesion was not hot on the nuclear medicine bone scan. This could be related to its small size, they could be no bone turnover at this location, or this second lesion could be artifactual.      Past Medical History:    Past Medical History:   Diagnosis Date   • Cancer    • Chronic pain    • COPD (chronic obstructive pulmonary disease)    • GERD (gastroesophageal  "reflux disease)    • Hypertension        Past Surgical History:    History reviewed. No pertinent surgical history.    Immunizations:      There is no immunization history on file for this patient.    Current Hospital Medications:      Current Facility-Administered Medications:   •  cefTRIAXone (ROCEPHIN) 1 g/100 mL 0.9% NS (MBP), 1 g, Intravenous, Q24H, Stopped at 04/17/17 1659 **AND** AZITHROMYCIN 500 MG/250 ML 0.9% NS IVPB (vial-mate), 500 mg, Intravenous, Q24H, Coni ALCOCER MD  •  Insert peripheral IV, , , Once **AND** sodium chloride 0.9 % flush 10 mL, 10 mL, Intravenous, PRN, Coni ALCOCER MD    Allergies:   Allergies   Allergen Reactions   • Fentanyl Mental Status Change     Made aggressive, \"out of my mind\"       Social History:    Social History     Social History   • Marital status:      Spouse name: N/A   • Number of children: N/A   • Years of education: N/A     Occupational History   • Not on file.     Social History Main Topics   • Smoking status: Former Smoker     Packs/day: 1.00     Types: Cigarettes     Quit date: 3/28/2016   • Smokeless tobacco: Not on file   • Alcohol use No   • Drug use: No   • Sexual activity: Yes     Partners: Female     Birth control/ protection: None     Other Topics Concern   • Not on file     Social History Narrative       Family History:   History reviewed. No pertinent family history.    REVIEW OF SYSTEMS:  Collected from family surrogate  Constitutional: no fever, no night sweats, no fatigue; positive for falls, generalized weakness  HEENT: no blurring of vision, no double vision, no hearing difficulty, no tinnitus,no ulceration, no dental caries, no dysphagia;      Lungs: no cough,  shortness of breath, no wheeze;   CVS: no palpitation, no chest pain, no shortness of breath; p  GI: no abdominal pain, no nausea , no vomiting, no constipation;   LICHA: no dysuria, frequency and urgency, no hematuria, no kidney stones;  Musculoskeletal: no joint pain, swelling , " "stiffness  Endocrine: no polyuria, polydypsia, no cold or heat intolerence;  Hematology: anemia, no easy brusing or bleeding, no hx of clotting disorder;  Dermatology: no skin rash, no eczema, no pruritis;  Psychiatry: no depression, no anxiety,no panic attacks, no suicide ideation;   Neurology: no syncope, no seizures, no numbness or tingling of hands, no numbness or tingling of feet, no paresis; confused    Vitals:    BP 91/59  Pulse (!) 140  Temp (!) 101.4 °F (38.6 °C) (Axillary)   Resp 20  Ht 72\" (182.9 cm)  Wt 214 lb (97.1 kg)  SpO2 97%  BMI 29.02 kg/m2    PHYSICAL EXAM:    CONSTITUTIONAL: Confused, no acute distress  EYES: Non icteric, EOM intact, pupils equal round   ENT: Mucus membranes moist, no oral pharyngeal lesions   NECK: Supple, no masses   CHEST/LUNGS: CTA bilaterally, normal respiratory effort   CARDIOVASCULAR: RRR, no murmurs  ABDOMEN: soft non-tender, active bowel sounds, no HSM  EXTREMITIES: warm, moves all fours  SKIN: warm, dry with no rashes or lesions  LYMPH: No cervical, clavicular, axillary, or inguinal lymphadenopathy  NEUROLOGIC: follows commands, non focal, Confused.   PSYCH: Confused.    CBC    Results from last 7 days  Lab Units 04/17/17  1052   WBC 10*3/mm3 6.26   HEMOGLOBIN g/dL 9.3*   HEMATOCRIT % 28.7*   PLATELETS 10*3/mm3 114*         Lab Results   Component Value Date     04/17/2017    K 5.2 04/17/2017     04/17/2017    CO2 25.0 04/17/2017    BUN 41 (H) 04/17/2017    CREATININE 1.24 04/17/2017    GLUCOSE 123 (H) 04/17/2017    CALCIUM 10.5 (H) 04/17/2017    BILITOT 0.5 04/17/2017    ALKPHOS 159 (H) 04/17/2017    AST 38 04/17/2017    ALT 23 04/17/2017    GLOB 3.5 04/17/2017       Lab Results   Component Value Date    INR 1.02 03/28/2017    INR 1.03 03/01/2017    INR 0.99 02/15/2016    PROTIME 13.7 03/28/2017    PROTIME 13.8 03/01/2017    PROTIME 13.4 02/15/2016     CXR 04/17/2017  1. A 3.4 cm density projected over the lower right hilum may represent a  known " pulmonary nodule/mass.  2. Patchy infiltrate at the right lung base could represent pneumonia.  3. There is volume loss in the right lung compared to the left.    ASSESSMENT/PLAN:    Severe sepsis- PNA 2/2 aspiration  -IVF bolus 500 cc and  cc.hr  -Lactate  -Blood cultures  -Zozyn 3.375 g IV q 6h     NSCLC/SCC low expressor PDL-1(1%)- Poor Performance status  - DNR  -Hospice consult.      Cancer related pain- Back pain- 2/2 bone mets  -Morphine 4 mg q 3 h prn pain    Confusion- 2/2 sepsis    Goals of care- DNR not agreesive. I had an extensive talk with several family members (wife and daughter included) about the goals of care. He is unfortunately not a candidate for anti-cancer therapy.  They wanna focus on comfort care. We discussed about consulting hospice to assess inpatient eligibility and pain control.     Messi Rosales MD    04/17/17  5:11 PM

## 2017-04-17 NOTE — PLAN OF CARE
Problem: Patient Care Overview (Adult)  Goal: Plan of Care Review  Outcome: Ongoing (interventions implemented as appropriate)    04/17/17 3898   Coping/Psychosocial Response Interventions   Plan Of Care Reviewed With patient;daughter;durable power of    Patient Care Overview   Progress no change   Outcome Evaluation   Outcome Summary/Follow up Plan Dr Rosales to see pt this eveneing. Pt positive sepsis screen and tx with bolus and continuous fluids. tolerating well. cont to monitor         Problem: Sepsis (Adult)  Goal: Signs and Symptoms of Listed Potential Problems Will be Absent or Manageable (Sepsis)  Outcome: Ongoing (interventions implemented as appropriate)    Problem: Pneumonia (Adult)  Goal: Signs and Symptoms of Listed Potential Problems Will be Absent or Manageable (Pneumonia)  Outcome: Outcome(s) achieved Date Met:  04/17/17

## 2017-04-17 NOTE — ED NOTES
Patient checked for incontinence at this time. Patient dry in pajama bottoms on clean sheet at this time.     Keisha Welsh RN  04/17/17 4252

## 2017-04-17 NOTE — ED NOTES
Called respiratory therapistNela at this time for blood gas collection     Keisha Welsh, RN  04/17/17 8400

## 2017-04-17 NOTE — ED PROVIDER NOTES
Subjective   HPI Comments: 67-year-old gentleman presents for our facility with family members at bedside via ambulance for acute change in mental status.  The daughter who is a nurse here at the hospital in labor and deliver states that he has been gradually becoming more tired unable to do his routine things that he did several days ago.  And today became somewhat more sleepy hence the activated 911 and brought him into our facility.    Patient has a small cell carcinoma of the lung nonoperable undergoing radiation and has significant amount of metastatic lesions noted per MRI recently.  Patient also was scheduled for an MRI of the thoracic or lumbar spine today as an outpatient procedure.  The history is collaborated from the patient and the daughter was at bedside the patient agrees to similar my leading questions otherwise 90% the history is obtained from the daughter.    He is recently experienced a diagnosis of cancer March of this year and subsequently has had progressive downward course.    He has been seen by our oncology team here [facility].    Daughter feels that he has got a pneumonia predominantly because of his recent congestion and cough possible fever and now somewhat more lethargic.        Patient is a 67 y.o. male presenting with altered mental status.   History provided by:  Patient and caregiver   used: No    Altered Mental Status   Presenting symptoms: confusion and lethargy    Severity:  Mild  Most recent episode:  2 days ago  Timing:  Intermittent  Progression:  Worsening  Chronicity:  Recurrent  Context: homelessness and recent illness    Context: not head injury    Associated symptoms: agitation and fever        Review of Systems   Constitutional: Positive for fever.   Psychiatric/Behavioral: Positive for agitation and confusion.   All other systems reviewed and are negative.      Past Medical History:   Diagnosis Date   • Cancer    • Chronic pain    • COPD (chronic  "obstructive pulmonary disease)    • GERD (gastroesophageal reflux disease)    • Hypertension        Allergies   Allergen Reactions   • Fentanyl Mental Status Change     Made aggressive, \"out of my mind\"       History reviewed. No pertinent surgical history.    History reviewed. No pertinent family history.    Social History     Social History   • Marital status:      Spouse name: N/A   • Number of children: N/A   • Years of education: N/A     Social History Main Topics   • Smoking status: Former Smoker     Packs/day: 1.00     Types: Cigarettes     Quit date: 3/28/2016   • Smokeless tobacco: None   • Alcohol use No   • Drug use: No   • Sexual activity: Yes     Partners: Female     Birth control/ protection: None     Other Topics Concern   • None     Social History Narrative           Objective   Physical Exam   Constitutional: He appears well-developed and well-nourished.   Really confused laying at 45° on the stretcher answers to leading questions at times makes good eye contact responses name.   HENT:   Head: Normocephalic and atraumatic.   Right Ear: External ear normal.   Left Ear: External ear normal.   Nose: Nose normal.   Mouth/Throat: Oropharynx is clear and moist.   Eyes: EOM are normal. Pupils are equal, round, and reactive to light.   Neck: Normal range of motion. Neck supple.   Cardiovascular:   Tachycardic and regular   Pulmonary/Chest:   His respiratory rate is about 20 22/m nonlabored no use of accessory muscles during inspiration and expiration he has bilateral rhonchi and rales noted.   Abdominal: Soft. Bowel sounds are normal.   Musculoskeletal: Normal range of motion.   Neurological: He is alert.   Confused and oriented to person and not place or time.     Skin: Skin is warm and dry.   Nursing note and vitals reviewed.      Procedures         ED Course  ED Course   Comment By Time   EKG at 1108 reveals a sinus tachycardic rate of 1 25/m no PVCs or ectopic beats. Coni ALCOCER MD 04/17 8964 "   IMPRESSION:  1. A 3.4 cm density projected over the lower right hilum may represent a  known pulmonary nodule/mass.  2. Patchy infiltrate at the right lung base could represent pneumonia.  3. There is volume loss in the right lung compared to the left. Coni ALCOCER MD 04/17 0170   Discussed with Dr. Manuel the metastatic lesions has worsened in the last 3 weeks when compared to the previous MRI. Coni ALCOCER MD 04/17 4481   D/W Dr. Rosales  It with a diagnosis of pneumonia tachycardia shortness of breath change in mental status.  Patient is a DO NOT RESUSCITATE per daughter who is at bedside. Coni ALCOCER MD 04/17 7981   We gave him Ativan 1 mg and then second milligram in the radiology suite because he was not holding still during the imaging.  They were unable to complete the imaging with IV contrast only the plane MRI has been obtained for the thoracolumbar area. Coni ALCOCER MD 04/17 1617                  MDM  Number of Diagnoses or Management Options  Fever in adult:   Hypoxia:   Pneumonia of both lungs due to infectious organism, unspecified part of lung:      Amount and/or Complexity of Data Reviewed  Clinical lab tests: reviewed and ordered  Tests in the radiology section of CPT®: ordered and reviewed  Discussion of test results with the performing providers: yes  Obtain history from someone other than the patient: yes  Discuss the patient with other providers: yes  Independent visualization of images, tracings, or specimens: yes    Risk of Complications, Morbidity, and/or Mortality  Presenting problems: high  Diagnostic procedures: high  Management options: high    Critical Care  Total time providing critical care: 30-74 minutes    Patient Progress  Patient progress: stable      Final diagnoses:   Pneumonia of both lungs due to infectious organism, unspecified part of lung   Hypoxia   Fever in adult            Coni ALCOCER MD  04/17/17 0863       Coni ALCOCER MD  04/17/17 6925

## 2017-04-18 ENCOUNTER — APPOINTMENT (OUTPATIENT)
Dept: RADIATION ONCOLOGY | Facility: HOSPITAL | Age: 67
End: 2017-04-18

## 2017-04-18 PROCEDURE — 25010000002 PIPERACILLIN SOD-TAZOBACTAM PER 1 G: Performed by: INTERNAL MEDICINE

## 2017-04-18 PROCEDURE — 25010000002 ONDANSETRON PER 1 MG: Performed by: INTERNAL MEDICINE

## 2017-04-18 PROCEDURE — 25010000002 MORPHINE PER 10 MG: Performed by: INTERNAL MEDICINE

## 2017-04-18 PROCEDURE — 25010000002 MORPHINE SULFATE (PF) 2 MG/ML SOLUTION: Performed by: INTERNAL MEDICINE

## 2017-04-18 RX ORDER — ONDANSETRON 2 MG/ML
4 INJECTION INTRAMUSCULAR; INTRAVENOUS EVERY 6 HOURS PRN
Status: DISCONTINUED | OUTPATIENT
Start: 2017-04-18 | End: 2017-04-20 | Stop reason: HOSPADM

## 2017-04-18 RX ORDER — MORPHINE SULFATE 2 MG/ML
5 INJECTION, SOLUTION INTRAMUSCULAR; INTRAVENOUS
Status: DISCONTINUED | OUTPATIENT
Start: 2017-04-18 | End: 2017-04-20

## 2017-04-18 RX ORDER — PANTOPRAZOLE SODIUM 40 MG/10ML
40 INJECTION, POWDER, LYOPHILIZED, FOR SOLUTION INTRAVENOUS
Status: DISCONTINUED | OUTPATIENT
Start: 2017-04-19 | End: 2017-04-20 | Stop reason: HOSPADM

## 2017-04-18 RX ORDER — ALPRAZOLAM 0.5 MG/1
1 TABLET ORAL EVERY 12 HOURS PRN
Status: DISCONTINUED | OUTPATIENT
Start: 2017-04-18 | End: 2017-04-20 | Stop reason: HOSPADM

## 2017-04-18 RX ORDER — MORPHINE SULFATE 30 MG/1
30 TABLET, FILM COATED, EXTENDED RELEASE ORAL EVERY 12 HOURS SCHEDULED
Status: DISCONTINUED | OUTPATIENT
Start: 2017-04-18 | End: 2017-04-20

## 2017-04-18 RX ORDER — MORPHINE SULFATE 4 MG/ML
4 INJECTION, SOLUTION INTRAMUSCULAR; INTRAVENOUS ONCE AS NEEDED
Status: DISCONTINUED | OUTPATIENT
Start: 2017-04-18 | End: 2017-04-20 | Stop reason: HOSPADM

## 2017-04-18 RX ORDER — MORPHINE SULFATE 4 MG/ML
4 INJECTION, SOLUTION INTRAMUSCULAR; INTRAVENOUS ONCE
Status: DISCONTINUED | OUTPATIENT
Start: 2017-04-18 | End: 2017-04-18

## 2017-04-18 RX ORDER — CALCIUM CARBONATE 750 MG/1
750 TABLET, CHEWABLE ORAL
Status: DISCONTINUED | OUTPATIENT
Start: 2017-04-18 | End: 2017-04-20 | Stop reason: HOSPADM

## 2017-04-18 RX ORDER — MORPHINE SULFATE 4 MG/ML
4 INJECTION, SOLUTION INTRAMUSCULAR; INTRAVENOUS
Status: DISCONTINUED | OUTPATIENT
Start: 2017-04-18 | End: 2017-04-18

## 2017-04-18 RX ADMIN — SODIUM CHLORIDE 125 ML/HR: 9 INJECTION, SOLUTION INTRAVENOUS at 01:35

## 2017-04-18 RX ADMIN — MORPHINE SULFATE 5 MG: 2 INJECTION, SOLUTION INTRAMUSCULAR; INTRAVENOUS at 23:35

## 2017-04-18 RX ADMIN — MORPHINE SULFATE 4 MG: 4 INJECTION, SOLUTION INTRAMUSCULAR; INTRAVENOUS at 19:15

## 2017-04-18 RX ADMIN — MORPHINE SULFATE 30 MG: 30 TABLET, EXTENDED RELEASE ORAL at 09:13

## 2017-04-18 RX ADMIN — MORPHINE SULFATE 4 MG: 4 INJECTION, SOLUTION INTRAMUSCULAR; INTRAVENOUS at 11:18

## 2017-04-18 RX ADMIN — MORPHINE SULFATE 4 MG: 4 INJECTION, SOLUTION INTRAMUSCULAR; INTRAVENOUS at 16:57

## 2017-04-18 RX ADMIN — MORPHINE SULFATE 30 MG: 30 TABLET, EXTENDED RELEASE ORAL at 21:12

## 2017-04-18 RX ADMIN — TAZOBACTAM SODIUM AND PIPERACILLIN SODIUM 3.38 G: 375; 3 INJECTION, SOLUTION INTRAVENOUS at 01:35

## 2017-04-18 RX ADMIN — MORPHINE SULFATE 4 MG: 4 INJECTION, SOLUTION INTRAMUSCULAR; INTRAVENOUS at 14:55

## 2017-04-18 RX ADMIN — TAZOBACTAM SODIUM AND PIPERACILLIN SODIUM 3.38 G: 375; 3 INJECTION, SOLUTION INTRAVENOUS at 20:42

## 2017-04-18 RX ADMIN — TAZOBACTAM SODIUM AND PIPERACILLIN SODIUM 3.38 G: 375; 3 INJECTION, SOLUTION INTRAVENOUS at 14:00

## 2017-04-18 RX ADMIN — MORPHINE SULFATE 4 MG: 4 INJECTION, SOLUTION INTRAMUSCULAR; INTRAVENOUS at 01:52

## 2017-04-18 RX ADMIN — MORPHINE SULFATE 4 MG: 4 INJECTION, SOLUTION INTRAMUSCULAR; INTRAVENOUS at 08:26

## 2017-04-18 RX ADMIN — Medication 750 MG: at 20:11

## 2017-04-18 RX ADMIN — TAZOBACTAM SODIUM AND PIPERACILLIN SODIUM 3.38 G: 375; 3 INJECTION, SOLUTION INTRAVENOUS at 08:26

## 2017-04-18 RX ADMIN — ALPRAZOLAM 1 MG: 0.5 TABLET ORAL at 21:12

## 2017-04-18 RX ADMIN — ONDANSETRON 4 MG: 2 INJECTION INTRAMUSCULAR; INTRAVENOUS at 21:15

## 2017-04-18 RX ADMIN — ZOLPIDEM TARTRATE 10 MG: 5 TABLET, COATED ORAL at 21:12

## 2017-04-18 NOTE — DISCHARGE PLACEMENT REQUEST
"ANALI SOLOMON [phone]    Joby Negrete (67 y.o. Male)     Date of Birth Social Security Number Address Home Phone MRN    1950  [address]  [facility] [place] [address] [phone] [id number]    Nondenominational Marital Status          Rastafari        Admission Date Admission Type Admitting Provider Attending Provider Department, Room/Bed    17 Emergency Messi Rosales MD Claudino, Wederson M, MD [address], 470/1    Discharge Date Discharge Disposition Discharge Destination                      Attending Provider: Messi Rosales MD     Allergies:  Fentanyl    Isolation:  None   Infection:  None   Code Status:  Conditional    Ht:  72\" (182.9 cm)   Wt:  213 lb 5 oz (96.8 kg)    Admission Cmt:  None   Principal Problem:  None                Active Insurance as of 2017     Primary Coverage     Payor Plan Insurance Group Employer/Plan Group    [id number]      Payor Plan Address Payor Plan Phone Number Effective From Effective To    [address] [phone] 3/27/2017     [place] [address]       Subscriber Name Subscriber Birth Date Member ID       JOBY NEGRETE 1950 [id number]                 Emergency Contacts      (Rel.) Home Phone Work Phone Mobile Phone    Josee Negrete (Spouse) -- -- [phone]    ChowRosalia colon (Daughter) -- -- [phone]        [address] [address]  Phone: [phone]  Fax:         Patient:     Joby Negrete MRN: [id number]   [address]  [place] [address] : 1950  SSN:    Phone: [phone] Sex: M      INSURANCE PAYOR PLAN GROUP # SUBSCRIBER ID   Primary: [employer] [id number]   [id number]   Admitting Diagnosis: Pain from bone metastases [G89.3, C79.51]  Order Date: 2017         Inpatient Consult to Hospice / [facility]    (Order ID: [id number])   Diagnosis:       Priority: Routine Expected Date:   Expiration " Date:        Interval:  Count:    Reason for Consult? lung cancer- poor performance. Comfort measures.     Specimen Type:   Specimen Source:   Specimen Taken Date:   Specimen Taken Time:                   Authorizing Provider:Messi Rosales MD  Order Entered By: Messi Rosales MD 4/17/2017 7:18 PM     Electronically signed by: Messi Rosales MD (NPI: [id number]) 4/17/2017 7:18 PM             History & Physical      Messi Rosales MD at 4/17/2017  5:11 PM              ADMISSION HISTORY & PHYSICAL    Pt Name: Mahamed Greene  MRN: [id number]  [id number]  YOB: 1950  Date of evaluation: 4/17/2017       HISTORY OF PRESENT ILLNESS:      Mahamed Greene is a 67 year old gentleman initially evaluated 3/20/2017 for new findings including a 4.3 cm RLL lung mass with significant mediastinal adenopathy and several likely metastatic liver and bone lesions.  Mr Greene presents for a follow-up after recent hospitalization where he had a CT-guided biopsy of the liver lesions. He also had MRI of the thoracic spine and cervical spine MRI of the brain and a bone scan for further staging of his disease. He was found to have diffuse metastatic disease to the spine without cord compression. He has been seen by Dr. Murali Sloan for palliative radiation to the sites of symptomatic bone disease. The biopsy of the liver lesion was consistent with squamous cell carcinoma poorly differentiated, lung origin PDl-1, 1%.    He is being admitted from the ER with worsening of his overall performance, recent falls at home, worsening mental status, hypotension and pneumonia. He had a fever of 101.7c.    Diagnosis  NSCLC, Poorly differentiated squamous cell carcinoma.   PDL1 - 1%  Treatment summary  Palliative RT bone lesions 7/13 treatements  Tumor target  large lobular nodule measuring 4.3 cm   Multiple mediastinal adenopathy.   Liver lesions   Bone lesions(L+T spine)  Lung mass-NSCLC, poorly differentiated  SCC PDL-1 1%, stage IV  Mr Greene is a 67 years old male first seen by (Dr. Rosales) on 3/20/2017 further workup of a lung mass/mediastinal adenopathy and multiple liver and bone lesions.  3/1/2017-he presented [facility] ED at [facility] with complaints of chest pain and the new onset dry cough for 3 weeks. He had associated weight loss of about 13 pounds over the last couple months.   3/1/2017-CT PE protocol and a CT of the abdomen/pelvis with contrast was performed. There was no evidence of pulmonary embolism. However, it showed a large lobular nodule measuring 4.3 cm in size in the right lower lobe. 11 mm nodule in the right posterior costophrenic angle within the right lower lobe. 4.3 cm right hilar lymph node. 2.4 cm right infrahilar nodes. 2.4 and 2.9 cm subcarinal node. Pretracheal node measuring 2.7 cm. 2 enlarged AP window lymph nodes measure 1.8 and 1.6 cm. Multiple right paratracheal nodes measuring 2.3 and 1.9 cm as well as a 2.4 cm high right paratracheal node. Emphysematous changes. Peripheral 10 mm nodes within the right upper lobe. Multiple liver lesions likely consistent metastatic disease. Lytic lesion involving the L1 pedicle with associated left-sided foraminal encroachment related to the soft tissue complement.   MRI of the lumbar spine was completed 3/20/17 at  and showed numerous metastatic lesions to the skeleton, the largest visualized lesion involving L1 with approximately 40% replacement of marrow, with questionable superior endplate deformity suspicious for nondisplaced pathologic fracture. Additional lesions are visualized in bilateral iliac bones and involving all visualized vertebral bodies.   3/28/2017-MRI of the thoracic spine- Multiple metastatic foci within the thoracic and lumbar spine as described above. The most extensive involvement is noted at the L1 vertebral level. I do not see any associated pathologic fracture or compromise of the spinal canal. 2. There is some  mild bulging of the discs within the mid and lower thoracic spine and upper lumbar column. There is mild associated central   narrowing of the canal at T11-T12. There is no evidence of contact with or contouring of the cord. The thoracic cord is normal in caliber with no evidence of abnormal T2 signal or expansile mass.  3/28/2017- MRI of the brain with contrast- 1. Atrophy with mild small vessel ischemic disease. 2. No evidence of restricted diffusion to suggest acute ischemia or infarct. 3. No evidence of mass lesion or abnormal contrast enhancement to suggest metastatic disease to the brain.  3/28/2017- MRI of the cervical spine- No evidence of metastatic disease to the cervical spine. Multilevel degenerative changes.  3/29/2017-bone scan- There is abnormal uptake in the L1 vertebral body in the left pedicle region. There is a destructive bone lesion in this area on CT.  There is mild uptake in the right T10 pedicle. There is mild uptake in the left T8 transverse process. There is abnormal uptake in the left anterior third rib and the left lateral fifth rib. There is probable degenerative uptake in the right AC joint. There is abnormal uptake in the left humeral head and in the mid to distal shaft of the left humerus. There is abnormal uptake in the proximal left femur in the region of the lesser trochanter. There is abnormal uptake in the proximal shaft of the right femur. There is absence of activity along the superior margin of the right iliac wing. There does appear to be a destructive lesion in this location on the CT images.    03/29/2017- CT guided liver biopsy- Metastatic Non-small cell carcinoma, with immunohistochemical features consistent with poorly differentiated squamous cell carcinoma. PDL1- 1.%    3/30/2017- x-ray humerus bilaterally-Lytic lesion in the distal humeral diaphysis on the LEFT is consistent with osseous metastatic disease.2. Questionable smaller lucent lesion in the more proximal LEFT  "humeral diaphysis could be artifactual or could represent a very small lytic lesion at this location. It should be noted that this lesion was not hot on the nuclear medicine bone scan. This could be related to its small size, they could be no bone turnover at this location, or this second lesion could be artifactual.      Past Medical History:    Past Medical History:   Diagnosis Date   • Cancer    • Chronic pain    • COPD (chronic obstructive pulmonary disease)    • GERD (gastroesophageal reflux disease)    • Hypertension        Past Surgical History:    History reviewed. No pertinent surgical history.    Immunizations:      There is no immunization history on file for this patient.    Current Hospital Medications:      Current Facility-Administered Medications:   •  cefTRIAXone (ROCEPHIN) 1 g/100 mL 0.9% NS (MBP), 1 g, Intravenous, Q24H, Stopped at 04/17/17 1659 **AND** AZITHROMYCIN 500 MG/250 ML 0.9% NS IVPB (vial-mate), 500 mg, Intravenous, Q24H, Coni ALCOCER MD  •  Insert peripheral IV, , , Once **AND** sodium chloride 0.9 % flush 10 mL, 10 mL, Intravenous, PRN, Coni ALCOCER MD    Allergies:   Allergies   Allergen Reactions   • Fentanyl Mental Status Change     Made aggressive, \"out of my mind\"       Social History:    Social History     Social History   • Marital status:      Spouse name: N/A   • Number of children: N/A   • Years of education: N/A     Occupational History   • Not on file.     Social History Main Topics   • Smoking status: Former Smoker     Packs/day: 1.00     Types: Cigarettes     Quit date: 3/28/2016   • Smokeless tobacco: Not on file   • Alcohol use No   • Drug use: No   • Sexual activity: Yes     Partners: Female     Birth control/ protection: None     Other Topics Concern   • Not on file     Social History Narrative       Family History:   History reviewed. No pertinent family history.    REVIEW OF SYSTEMS:  Collected from family surrogate  Constitutional: no fever, no night " "sweats, no fatigue; positive for falls, generalized weakness  HEENT: no blurring of vision, no double vision, no hearing difficulty, no tinnitus,no ulceration, no dental caries, no dysphagia;      Lungs: no cough,  shortness of breath, no wheeze;   CVS: no palpitation, no chest pain, no shortness of breath; p  GI: no abdominal pain, no nausea , no vomiting, no constipation;   LICHA: no dysuria, frequency and urgency, no hematuria, no kidney stones;  Musculoskeletal: no joint pain, swelling , stiffness  Endocrine: no polyuria, polydypsia, no cold or heat intolerence;  Hematology: anemia, no easy brusing or bleeding, no hx of clotting disorder;  Dermatology: no skin rash, no eczema, no pruritis;  Psychiatry: no depression, no anxiety,no panic attacks, no suicide ideation;   Neurology: no syncope, no seizures, no numbness or tingling of hands, no numbness or tingling of feet, no paresis; confused    Vitals:    BP 91/59  Pulse (!) 140  Temp (!) 101.4 °F (38.6 °C) (Axillary)   Resp 20  Ht 72\" (182.9 cm)  Wt 214 lb (97.1 kg)  SpO2 97%  BMI 29.02 kg/m2    PHYSICAL EXAM:    CONSTITUTIONAL: Confused, no acute distress  EYES: Non icteric, EOM intact, pupils equal round   ENT: Mucus membranes moist, no oral pharyngeal lesions   NECK: Supple, no masses   CHEST/LUNGS: CTA bilaterally, normal respiratory effort   CARDIOVASCULAR: RRR, no murmurs  ABDOMEN: soft non-tender, active bowel sounds, no HSM  EXTREMITIES: warm, moves all fours  SKIN: warm, dry with no rashes or lesions  LYMPH: No cervical, clavicular, axillary, or inguinal lymphadenopathy  NEUROLOGIC: follows commands, non focal, Confused.   PSYCH: Confused.    CBC    Results from last 7 days  Lab Units 04/17/17  1052   WBC 10*3/mm3 6.26   HEMOGLOBIN g/dL 9.3*   HEMATOCRIT % 28.7*   PLATELETS 10*3/mm3 114*         Lab Results   Component Value Date     04/17/2017    K 5.2 04/17/2017     04/17/2017    CO2 25.0 04/17/2017    BUN 41 (H) 04/17/2017    " CREATININE 1.24 04/17/2017    GLUCOSE 123 (H) 04/17/2017    CALCIUM 10.5 (H) 04/17/2017    BILITOT 0.5 04/17/2017    ALKPHOS 159 (H) 04/17/2017    AST 38 04/17/2017    ALT 23 04/17/2017    GLOB 3.5 04/17/2017       Lab Results   Component Value Date    INR 1.02 03/28/2017    INR 1.03 03/01/2017    INR 0.99 02/15/2016    PROTIME 13.7 03/28/2017    PROTIME 13.8 03/01/2017    PROTIME 13.4 02/15/2016     CXR 04/17/2017  1. A 3.4 cm density projected over the lower right hilum may represent a  known pulmonary nodule/mass.  2. Patchy infiltrate at the right lung base could represent pneumonia.  3. There is volume loss in the right lung compared to the left.    ASSESSMENT/PLAN:    Severe sepsis- PNA 2/2 aspiration  -IVF bolus 500 cc and  cc.hr  -Lactate  -Blood cultures  -Zozyn 3.375 g IV q 6h     NSCLC/SCC low expressor PDL-1(1%)- Poor Performance status  - DNR  -Hospice consult.      Cancer related pain- Back pain- 2/2 bone mets  -Morphine 4 mg q 3 h prn pain    Confusion- 2/2 sepsis    Goals of care- DNR not agreesive. I had an extensive talk with several family members (wife and daughter included) about the goals of care. He is unfortunately not a candidate for anti-cancer therapy.  They wanna focus on comfort care. We discussed about consulting hospice to assess inpatient eligibility and pain control.     Messi Rosales MD    04/17/17  5:11 PM         Electronically signed by Messi Rosales MD at 4/17/2017  7:17 PM        Hospital Medications (active)       Dose Frequency Start End    albuterol (PROVENTIL) nebulizer solution 0.083% 2.5 mg/3mL 2.5 mg Every 4 Hours PRN 4/17/2017     Sig - Route: Take 2.5 mg by nebulization Every 4 (Four) Hours As Needed for Wheezing. - Nebulization    LORazepam (ATIVAN) injection 1 mg 1 mg Once 4/17/2017 4/17/2017    Sig - Route: Infuse 0.5 mL into a venous catheter 1 (One) Time. - Intravenous    LORazepam (ATIVAN) injection 1 mg 1 mg Once 4/17/2017 4/17/2017    Sig -  "Route: Infuse 0.5 mL into a venous catheter 1 (One) Time. - Intravenous    Morphine (MS CONTIN) 12 hr tablet 30 mg 30 mg Every 12 Hours Scheduled 4/18/2017 4/28/2017    Sig - Route: Take 1 tablet by mouth Every 12 (Twelve) Hours. - Oral    Morphine sulfate (PF) injection 4 mg 4 mg Every 3 Hours PRN 4/17/2017 4/27/2017    Sig - Route: Infuse 1 mL into a venous catheter Every 3 (Three) Hours As Needed for Severe Pain (7-10). - Intravenous    Morphine sulfate (PF) injection 4 mg 4 mg Once 4/18/2017     Sig - Route: Inject 1 mL under the skin 1 (One) Time. - Subcutaneous    piperacillin-tazobactam (ZOSYN) 3.375 g in iso-osmotic dextrose 50 ml (premix) 3.375 g Every 6 Hours 4/17/2017     Sig - Route: Infuse 50 mL into a venous catheter Every 6 (Six) Hours. - Intravenous    sodium chloride 0.9 % bolus 500 mL 500 mL Once 4/17/2017 4/17/2017    Sig - Route: Infuse 500 mL into a venous catheter 1 (One) Time. - Intravenous    sodium chloride 0.9 % flush 10 mL 10 mL As Needed 4/17/2017     Sig - Route: Infuse 10 mL into a venous catheter As Needed for Line Care. - Intravenous    Linked Group 1:  \"And\" Linked Group Details        sodium chloride 0.9 % infusion 125 mL/hr Continuous 4/17/2017     Sig - Route: Infuse 125 mL/hr into a venous catheter Continuous. - Intravenous    zolpidem (AMBIEN) tablet 10 mg 10 mg Nightly PRN 4/17/2017     Sig - Route: Take 2 tablets by mouth At Night As Needed for Sleep. - Oral    AZITHROMYCIN 500 MG/250 ML 0.9% NS IVPB (vial-mate) (Discontinued) 500 mg Every 24 Hours 4/17/2017 4/17/2017    Sig - Route: Infuse 500 mg into a venous catheter Daily. - Intravenous    Linked Group 2:  \"And\" Linked Group Details        cefTRIAXone (ROCEPHIN) 1 g/100 mL 0.9% NS (MBP) (Discontinued) 1 g Every 24 Hours 4/17/2017 4/17/2017    Sig - Route: Infuse 100 mL into a venous catheter Daily. - Intravenous    Linked Group 2:  \"And\" Linked Group Details        enoxaparin (LOVENOX) syringe 40 mg (Discontinued) 40 mg " Daily 2017    Sig - Route: Inject 0.4 mL under the skin Daily. - Subcutaneous          Operative/Procedure Notes (last 24 hours) (Notes from 2017 10:04 AM through 2017 10:04 AM)     No notes of this type exist for this encounter.           Physician Progress Notes (last 24 hours) (Notes from 2017 10:04 AM through 2017 10:04 AM)      Messi Rosales MD at 2017  8:24 AM  Version 1 of 1           PROGRESS NOTE  Patient name: Mahamed Greene  Patient : 1950  VISIT # [id number]  MR #[id number]    SUBJECTIVE: More alert this am and oriented. Complains of significant back pain. Afebrile    Mahamed Greene is a 67 year old gentleman initially evaluated 3/20/2017 for new findings including a 4.3 cm RLL lung mass with significant mediastinal adenopathy and several likely metastatic liver and bone lesions.  Mr Greene presents for a follow-up after recent hospitalization where he had a CT-guided biopsy of the liver lesions. He also had MRI of the thoracic spine and cervical spine MRI of the brain and a bone scan for further staging of his disease. He was found to have diffuse metastatic disease to the spine without cord compression. He has been seen by Dr. Murali Sloan for palliative radiation to the sites of symptomatic bone disease. The biopsy of the liver lesion was consistent with squamous cell carcinoma poorly differentiated, lung origin PDl-1, 1%.     He is being admitted from the ER with worsening of his overall performance, recent falls at home, worsening mental status, hypotension and pneumonia. He had a fever of 101.7c.     Diagnosis  · NSCLC, Poorly differentiated squamous cell carcinoma.   · PDL1 - 1%  Treatment summary  · Palliative RT bone lesions  treatements  Tumor target  · large lobular nodule measuring 4.3 cm   · Multiple mediastinal adenopathy.   · Liver lesions   · Bone lesions(L+T spine)  Lung mass-NSCLC, poorly differentiated SCC PDL-1 1%,  stage IV  Mr Greene is a 67 years old male first seen by (Dr. Rosales) on 3/20/2017 further workup of a lung mass/mediastinal adenopathy and multiple liver and bone lesions.  · 3/1/2017-he presented to the ED at [facility] with complaints of chest pain and the new onset dry cough for 3 weeks. He had associated weight loss of about 13 pounds over the last couple months.   · 3/1/2017-CT PE protocol and a CT of the abdomen/pelvis with contrast was performed. There was no evidence of pulmonary embolism. However, it showed a large lobular nodule measuring 4.3 cm in size in the right lower lobe. 11 mm nodule in the right posterior costophrenic angle within the right lower lobe. 4.3 cm right hilar lymph node. 2.4 cm right infrahilar nodes. 2.4 and 2.9 cm subcarinal node. Pretracheal node measuring 2.7 cm. 2 enlarged AP window lymph nodes measure 1.8 and 1.6 cm. Multiple right paratracheal nodes measuring 2.3 and 1.9 cm as well as a 2.4 cm high right paratracheal node. Emphysematous changes. Peripheral 10 mm nodes within the right upper lobe. Multiple liver lesions likely consistent metastatic disease. Lytic lesion involving the L1 pedicle with associated left-sided foraminal encroachment related to the soft tissue complement.   · MRI of the lumbar spine was completed 3/20/17 at  and showed numerous metastatic lesions to the skeleton, the largest visualized lesion involving L1 with approximately 40% replacement of marrow, with questionable superior endplate deformity suspicious for nondisplaced pathologic fracture. Additional lesions are visualized in bilateral iliac bones and involving all visualized vertebral bodies.   · 3/28/2017-MRI of the thoracic spine- Multiple metastatic foci within the thoracic and lumbar spine as described above. The most extensive involvement is noted at the L1 vertebral level. I do not see any associated pathologic fracture or compromise of the spinal canal. 2. There is some mild  bulging of the discs within the mid and lower thoracic spine and upper lumbar column. There is mild associated central   narrowing of the canal at T11-T12. There is no evidence of contact with or contouring of the cord. The thoracic cord is normal in caliber with no evidence of abnormal T2 signal or expansile mass.  · 3/28/2017- MRI of the brain with contrast- 1. Atrophy with mild small vessel ischemic disease. 2. No evidence of restricted diffusion to suggest acute ischemia or infarct. 3. No evidence of mass lesion or abnormal contrast enhancement to suggest metastatic disease to the brain.  · 3/28/2017- MRI of the cervical spine- No evidence of metastatic disease to the cervical spine. Multilevel degenerative changes.  · 3/29/2017-bone scan- There is abnormal uptake in the L1 vertebral body in the left pedicle region. There is a destructive bone lesion in this area on CT.  There is mild uptake in the right T10 pedicle. There is mild uptake in the left T8 transverse process. There is abnormal uptake in the left anterior third rib and the left lateral fifth rib. There is probable degenerative uptake in the right AC joint. There is abnormal uptake in the left humeral head and in the mid to distal shaft of the left humerus. There is abnormal uptake in the proximal left femur in the region of the lesser trochanter. There is abnormal uptake in the proximal shaft of the right femur. There is absence of activity along the superior margin of the right iliac wing. There does appear to be a destructive lesion in this location on the CT images.     03/29/2017- CT guided liver biopsy- Metastatic Non-small cell carcinoma, with immunohistochemical features consistent with poorly differentiated squamous cell carcinoma. PDL1- 1.%  ·    · 3/30/2017- x-ray humerus bilaterally-Lytic lesion in the distal humeral diaphysis on the LEFT is consistent with osseous metastatic disease.2. Questionable smaller lucent lesion in the more  proximal LEFT humeral diaphysis could be artifactual or could represent a very small lytic lesion at this location. It should be noted that this lesion was not hot on the nuclear medicine bone scan. This could be related to its small size, they could be no bone turnover at this location, or this second lesion could be artifactual.    Vitals:    04/18/17 0755   BP: 127/76   Pulse: 84   Resp: 20   Temp: 97.4 °F (36.3 °C)   SpO2: 93%       Intake/Output Summary (Last 24 hours) at 04/18/17 0824  Last data filed at 04/18/17 0400   Gross per 24 hour   Intake          1506.05 ml   Output             1300 ml   Net           206.05 ml       PHYSICAL EXAM:   CONSTITUTIONAL: Alert, no acute distress  EYES: Non icteric, EOM intact, pupils equal round   ENT: Mucus membranes moist, no oral pharyngeal lesions   NECK: Supple, no masses   CHEST/LUNGS: diffuse ronchii, normal respiratory effort   CARDIOVASCULAR: RRR, no murmurs  ABDOMEN: soft non-tender, active bowel sounds, no HSM  EXTREMITIES: warm, moves all fours  SKIN: warm, dry with no rashes or lesions  LYMPH: No cervical, clavicular, axillary, or inguinal lymphadenopathy  NEUROLOGIC: follows commands, non focal     CBC    Results from last 7 days  Lab Units 04/17/17  1052   WBC 10*3/mm3 6.26   HEMOGLOBIN g/dL 9.3*   HEMATOCRIT % 28.7*   PLATELETS 10*3/mm3 114*         Lab Results   Component Value Date     04/17/2017    K 5.2 04/17/2017     04/17/2017    CO2 25.0 04/17/2017    BUN 41 (H) 04/17/2017    CREATININE 1.24 04/17/2017    GLUCOSE 123 (H) 04/17/2017    CALCIUM 10.5 (H) 04/17/2017    BILITOT 0.5 04/17/2017    ALKPHOS 159 (H) 04/17/2017    AST 38 04/17/2017    ALT 23 04/17/2017    GLOB 3.5 04/17/2017       Lab Results   Component Value Date    INR 1.02 03/28/2017    INR 1.03 03/01/2017    INR 0.99 02/15/2016    PROTIME 13.7 03/28/2017    PROTIME 13.8 03/01/2017    PROTIME 13.4 02/15/2016       ASSESSMENT/PLAN:       CXR 04/17/2017  1. A 3.4 cm density  projected over the lower right hilum may represent a  known pulmonary nodule/mass.  2. Patchy infiltrate at the right lung base could represent pneumonia.  3. There is volume loss in the right lung compared to the left.    MRI T spine 04/17/2017  Progression of osseous metastases compared to the 03/28/2017  examination.     ASSESSMENT/PLAN:     Severe sepsis- PNA 2/2 aspiration  -IVF bolus 500 cc and  cc.hr  -Lactate- 1.2  -Blood cultures- NGTD  -Zozyn 3.375 g IV q 6h      NSCLC/SCC low expressor PDL-1(1%)- Poor Performance status  -DNR  -Hospice consult.       Cancer related pain- Back pain- 2/2 bone mets  -Morphine 4 mg q 3 h prn pain  -Morphine SR 30mg q 12h     Confusion- 2/2 sepsis  -Improving with IVF and atbxs    Anemia- 2/2 cancer, anemia of chronic disease.   -No need for transfusion`     Goals of care- DNR not agreesive. I had an extensive talk with the patient and several of his family members (wife, daughter included) about the goals of care. He is unfortunately not a candidate for anti-cancer therapy. They wanna focus on comfort care. We discussed about consulting hospice to assess inpatient eligibility and pain control.         Messi Rosales MD    04/18/17  8:24 AM           Electronically signed by Messi Rosales MD at 4/18/2017  8:36 AM        Consult Notes (last 24 hours) (Notes from 4/17/2017 10:04 AM through 4/18/2017 10:04 AM)     No notes of this type exist for this encounter.        Nutrition Notes (last 24 hours) (Notes from 4/17/2017 10:04 AM through 4/18/2017 10:04 AM)     No notes of this type exist for this encounter.        Respiratory Therapy Notes (last 24 hours) (Notes from 4/17/2017 10:04 AM through 4/18/2017 10:04 AM)     No notes of this type exist for this encounter.

## 2017-04-18 NOTE — PROGRESS NOTES
Continued Stay Note  [facility]     Patient Name: Mahamed Greene  MRN: [id number]  Today's Date: 4/18/2017    Admit Date: 4/17/2017          Discharge Plan       04/18/17 1017    Case Management/Social Work Plan    Plan [facility] has been faxed order for hospice    Additional Comments Spoke with Rohini at [facility] [phone] and faxed needed records including MD order for hospice [phone].                Discharge Codes     None            TRENTON Irene

## 2017-04-18 NOTE — PROGRESS NOTES
PROGRESS NOTE  Patient name: Mahamed Greene  Patient : 1950  VISIT # [id number]  MR #[id number]    SUBJECTIVE: More alert this am and oriented. Complains of significant back pain. Afebrile    Mahamed Greene is a 67 year old gentleman initially evaluated 3/20/2017 for new findings including a 4.3 cm RLL lung mass with significant mediastinal adenopathy and several likely metastatic liver and bone lesions.  Mr Greene presents for a follow-up after recent hospitalization where he had a CT-guided biopsy of the liver lesions. He also had MRI of the thoracic spine and cervical spine MRI of the brain and a bone scan for further staging of his disease. He was found to have diffuse metastatic disease to the spine without cord compression. He has been seen by Dr. Murali Sloan for palliative radiation to the sites of symptomatic bone disease. The biopsy of the liver lesion was consistent with squamous cell carcinoma poorly differentiated, lung origin PDl-1, 1%.     He is being admitted from the ER with worsening of his overall performance, recent falls at home, worsening mental status, hypotension and pneumonia. He had a fever of 101.7c.     Diagnosis  · NSCLC, Poorly differentiated squamous cell carcinoma.   · PDL1 - 1%  Treatment summary  · Palliative RT bone lesions  treatements  Tumor target  · large lobular nodule measuring 4.3 cm   · Multiple mediastinal adenopathy.   · Liver lesions   · Bone lesions(L+T spine)  Lung mass-NSCLC, poorly differentiated SCC PDL-1 1%, stage IV  Mr Greene is a 67 years old male first seen by (Dr. Rosales) on 3/20/2017 further workup of a lung mass/mediastinal adenopathy and multiple liver and bone lesions.  · 3/1/2017-he presented to the ED at [facility] with complaints of chest pain and the new onset dry cough for 3 weeks. He had associated weight loss of about 13 pounds over the last couple months.   · 3/1/2017-CT PE protocol and a CT of the abdomen/pelvis  with contrast was performed. There was no evidence of pulmonary embolism. However, it showed a large lobular nodule measuring 4.3 cm in size in the right lower lobe. 11 mm nodule in the right posterior costophrenic angle within the right lower lobe. 4.3 cm right hilar lymph node. 2.4 cm right infrahilar nodes. 2.4 and 2.9 cm subcarinal node. Pretracheal node measuring 2.7 cm. 2 enlarged AP window lymph nodes measure 1.8 and 1.6 cm. Multiple right paratracheal nodes measuring 2.3 and 1.9 cm as well as a 2.4 cm high right paratracheal node. Emphysematous changes. Peripheral 10 mm nodes within the right upper lobe. Multiple liver lesions likely consistent metastatic disease. Lytic lesion involving the L1 pedicle with associated left-sided foraminal encroachment related to the soft tissue complement.   · MRI of the lumbar spine was completed 3/20/17 at  and showed numerous metastatic lesions to the skeleton, the largest visualized lesion involving L1 with approximately 40% replacement of marrow, with questionable superior endplate deformity suspicious for nondisplaced pathologic fracture. Additional lesions are visualized in bilateral iliac bones and involving all visualized vertebral bodies.   · 3/28/2017-MRI of the thoracic spine- Multiple metastatic foci within the thoracic and lumbar spine as described above. The most extensive involvement is noted at the L1 vertebral level. I do not see any associated pathologic fracture or compromise of the spinal canal. 2. There is some mild bulging of the discs within the mid and lower thoracic spine and upper lumbar column. There is mild associated central   narrowing of the canal at T11-T12. There is no evidence of contact with or contouring of the cord. The thoracic cord is normal in caliber with no evidence of abnormal T2 signal or expansile mass.  · 3/28/2017- MRI of the brain with contrast- 1. Atrophy with mild small vessel ischemic disease. 2. No evidence of restricted  diffusion to suggest acute ischemia or infarct. 3. No evidence of mass lesion or abnormal contrast enhancement to suggest metastatic disease to the brain.  · 3/28/2017- MRI of the cervical spine- No evidence of metastatic disease to the cervical spine. Multilevel degenerative changes.  · 3/29/2017-bone scan- There is abnormal uptake in the L1 vertebral body in the left pedicle region. There is a destructive bone lesion in this area on CT.  There is mild uptake in the right T10 pedicle. There is mild uptake in the left T8 transverse process. There is abnormal uptake in the left anterior third rib and the left lateral fifth rib. There is probable degenerative uptake in the right AC joint. There is abnormal uptake in the left humeral head and in the mid to distal shaft of the left humerus. There is abnormal uptake in the proximal left femur in the region of the lesser trochanter. There is abnormal uptake in the proximal shaft of the right femur. There is absence of activity along the superior margin of the right iliac wing. There does appear to be a destructive lesion in this location on the CT images.     03/29/2017- CT guided liver biopsy- Metastatic Non-small cell carcinoma, with immunohistochemical features consistent with poorly differentiated squamous cell carcinoma. PDL1- 1.%  ·    · 3/30/2017- x-ray humerus bilaterally-Lytic lesion in the distal humeral diaphysis on the LEFT is consistent with osseous metastatic disease.2. Questionable smaller lucent lesion in the more proximal LEFT humeral diaphysis could be artifactual or could represent a very small lytic lesion at this location. It should be noted that this lesion was not hot on the nuclear medicine bone scan. This could be related to its small size, they could be no bone turnover at this location, or this second lesion could be artifactual.    Vitals:    04/18/17 0755   BP: 127/76   Pulse: 84   Resp: 20   Temp: 97.4 °F (36.3 °C)   SpO2: 93%        Intake/Output Summary (Last 24 hours) at 04/18/17 0824  Last data filed at 04/18/17 0400   Gross per 24 hour   Intake          1506.05 ml   Output             1300 ml   Net           206.05 ml       PHYSICAL EXAM:   CONSTITUTIONAL: Alert, no acute distress  EYES: Non icteric, EOM intact, pupils equal round   ENT: Mucus membranes moist, no oral pharyngeal lesions   NECK: Supple, no masses   CHEST/LUNGS: diffuse ronchii, normal respiratory effort   CARDIOVASCULAR: RRR, no murmurs  ABDOMEN: soft non-tender, active bowel sounds, no HSM  EXTREMITIES: warm, moves all fours  SKIN: warm, dry with no rashes or lesions  LYMPH: No cervical, clavicular, axillary, or inguinal lymphadenopathy  NEUROLOGIC: follows commands, non focal     CBC    Results from last 7 days  Lab Units 04/17/17  1052   WBC 10*3/mm3 6.26   HEMOGLOBIN g/dL 9.3*   HEMATOCRIT % 28.7*   PLATELETS 10*3/mm3 114*         Lab Results   Component Value Date     04/17/2017    K 5.2 04/17/2017     04/17/2017    CO2 25.0 04/17/2017    BUN 41 (H) 04/17/2017    CREATININE 1.24 04/17/2017    GLUCOSE 123 (H) 04/17/2017    CALCIUM 10.5 (H) 04/17/2017    BILITOT 0.5 04/17/2017    ALKPHOS 159 (H) 04/17/2017    AST 38 04/17/2017    ALT 23 04/17/2017    GLOB 3.5 04/17/2017       Lab Results   Component Value Date    INR 1.02 03/28/2017    INR 1.03 03/01/2017    INR 0.99 02/15/2016    PROTIME 13.7 03/28/2017    PROTIME 13.8 03/01/2017    PROTIME 13.4 02/15/2016       ASSESSMENT/PLAN:       CXR 04/17/2017  1. A 3.4 cm density projected over the lower right hilum may represent a  known pulmonary nodule/mass.  2. Patchy infiltrate at the right lung base could represent pneumonia.  3. There is volume loss in the right lung compared to the left.    MRI T spine 04/17/2017  Progression of osseous metastases compared to the 03/28/2017  examination.     ASSESSMENT/PLAN:     Severe sepsis- PNA 2/2 aspiration  -IVF bolus 500 cc and  cc.hr  -Lactate- 1.2  -Blood  cultures- NGTD  -Zozyn 3.375 g IV q 6h      NSCLC/SCC low expressor PDL-1(1%)- Poor Performance status  -DNR  -Hospice consult.       Cancer related pain- Back pain- 2/2 bone mets  -Morphine 4 mg q 3 h prn pain  -Morphine SR 30mg q 12h     Confusion- 2/2 sepsis  -Improving with IVF and atbxs    Anemia- 2/2 cancer, anemia of chronic disease.   -No need for transfusion`     Goals of care- DNR not agreesive. I had an extensive talk with the patient and several of his family members (wife, daughter included) about the goals of care. He is unfortunately not a candidate for anti-cancer therapy. They wanna focus on comfort care. We discussed about consulting hospice to assess inpatient eligibility and pain control.         Messi Rosales MD    04/18/17  8:24 AM

## 2017-04-18 NOTE — PLAN OF CARE
Problem: Patient Care Overview (Adult)  Goal: Plan of Care Review  Outcome: Ongoing (interventions implemented as appropriate)    04/18/17 6842   Coping/Psychosocial Response Interventions   Plan Of Care Reviewed With patient;daughter;durable power of    Patient Care Overview   Progress declining   Outcome Evaluation   Outcome Summary/Follow up Plan cont to monitor, do not move to units for any reason, DNR antibiotics and fluids only         Problem: Oncology Care (Adult)  Goal: Signs and Symptoms of Listed Potential Problems Will be Absent or Manageable (Oncology Care)  Outcome: Ongoing (interventions implemented as appropriate)    Problem: Fall Risk (Adult)  Goal: Identify Related Risk Factors and Signs and Symptoms  Outcome: Outcome(s) achieved Date Met:  04/18/17  Goal: Absence of Falls  Outcome: Ongoing (interventions implemented as appropriate)    Problem: Pressure Ulcer Risk (Edgar Scale) (Adult,Obstetrics,Pediatric)  Goal: Identify Related Risk Factors and Signs and Symptoms  Outcome: Outcome(s) achieved Date Met:  04/18/17  Goal: Skin Integrity  Outcome: Ongoing (interventions implemented as appropriate)

## 2017-04-18 NOTE — PROGRESS NOTES
Discharge Planning Assessment   [place]     Patient Name: Mahamed Greene  MRN: [id number]  Today's Date: 4/18/2017    Admit Date: 4/17/2017          Discharge Needs Assessment       04/18/17 1010    Living Environment    Lives With spouse    Living Arrangements house    Provides Primary Care For no one    Primary Care Provided By spouse/significant other    Quality Of Family Relationships supportive    Able to Return to Prior Living Arrangements yes    Discharge Needs Assessment    Concerns To Be Addressed basic needs concerns;home safety concerns    Equipment Currently Used at Home oxygen;wheelchair;walker, rolling;cane, straight;commode   [facility]    Discharge Planning Comments LIVES WITH SPOUSE; HAS ALL DME NEEDED; [facility]; [facility]             Discharge Plan     None        Discharge Placement     No information found                Demographic Summary     None            Functional Status     None            Psychosocial     None            Abuse/Neglect     None            Legal     None            Substance Abuse     None            Patient Forms     None          Jolene Granado RN

## 2017-04-18 NOTE — PLAN OF CARE
Problem: Patient Care Overview (Adult)  Goal: Plan of Care Review  Outcome: Ongoing (interventions implemented as appropriate)    04/18/17 8439   Coping/Psychosocial Response Interventions   Plan Of Care Reviewed With family   Patient Care Overview   Progress no change   Outcome Evaluation   Outcome Summary/Follow up Plan Initial RD assessment this admission. Pt noted to have decreased appetite and potential wt loss. PO intake today avg 10%/2meals. No c/o GI symptoms/issues. Did order Ensure daily. Will continue to FU,         Problem: Sepsis (Adult)  Goal: Signs and Symptoms of Listed Potential Problems Will be Absent or Manageable (Sepsis)  Outcome: Ongoing (interventions implemented as appropriate)

## 2017-04-19 PROCEDURE — 25010000002 MORPHINE SULFATE (PF) 2 MG/ML SOLUTION: Performed by: INTERNAL MEDICINE

## 2017-04-19 PROCEDURE — 93005 ELECTROCARDIOGRAM TRACING: CPT | Performed by: INTERNAL MEDICINE

## 2017-04-19 PROCEDURE — 93010 ELECTROCARDIOGRAM REPORT: CPT | Performed by: INTERNAL MEDICINE

## 2017-04-19 PROCEDURE — 25010000002 PIPERACILLIN SOD-TAZOBACTAM PER 1 G: Performed by: INTERNAL MEDICINE

## 2017-04-19 RX ORDER — POLYETHYLENE GLYCOL 3350 17 G/17G
17 POWDER, FOR SOLUTION ORAL DAILY
Status: DISCONTINUED | OUTPATIENT
Start: 2017-04-19 | End: 2017-04-20 | Stop reason: HOSPADM

## 2017-04-19 RX ADMIN — MORPHINE SULFATE 5 MG: 2 INJECTION, SOLUTION INTRAMUSCULAR; INTRAVENOUS at 10:13

## 2017-04-19 RX ADMIN — TAZOBACTAM SODIUM AND PIPERACILLIN SODIUM 3.38 G: 375; 3 INJECTION, SOLUTION INTRAVENOUS at 08:13

## 2017-04-19 RX ADMIN — MORPHINE SULFATE 5 MG: 2 INJECTION, SOLUTION INTRAMUSCULAR; INTRAVENOUS at 14:43

## 2017-04-19 RX ADMIN — TAZOBACTAM SODIUM AND PIPERACILLIN SODIUM 3.38 G: 375; 3 INJECTION, SOLUTION INTRAVENOUS at 00:45

## 2017-04-19 RX ADMIN — MORPHINE SULFATE 5 MG: 2 INJECTION, SOLUTION INTRAMUSCULAR; INTRAVENOUS at 16:43

## 2017-04-19 RX ADMIN — MORPHINE SULFATE 30 MG: 30 TABLET, EXTENDED RELEASE ORAL at 08:13

## 2017-04-19 RX ADMIN — PANTOPRAZOLE SODIUM 40 MG: 40 INJECTION, POWDER, FOR SOLUTION INTRAVENOUS at 06:11

## 2017-04-19 RX ADMIN — MORPHINE SULFATE 30 MG: 30 TABLET, EXTENDED RELEASE ORAL at 20:50

## 2017-04-19 RX ADMIN — MORPHINE SULFATE 5 MG: 2 INJECTION, SOLUTION INTRAMUSCULAR; INTRAVENOUS at 22:57

## 2017-04-19 RX ADMIN — SODIUM CHLORIDE 125 ML/HR: 9 INJECTION, SOLUTION INTRAVENOUS at 19:57

## 2017-04-19 RX ADMIN — TAZOBACTAM SODIUM AND PIPERACILLIN SODIUM 3.38 G: 375; 3 INJECTION, SOLUTION INTRAVENOUS at 14:43

## 2017-04-19 RX ADMIN — MORPHINE SULFATE 5 MG: 2 INJECTION, SOLUTION INTRAMUSCULAR; INTRAVENOUS at 04:12

## 2017-04-19 RX ADMIN — MORPHINE SULFATE 5 MG: 2 INJECTION, SOLUTION INTRAMUSCULAR; INTRAVENOUS at 12:38

## 2017-04-19 RX ADMIN — MORPHINE SULFATE 5 MG: 2 INJECTION, SOLUTION INTRAMUSCULAR; INTRAVENOUS at 20:51

## 2017-04-19 RX ADMIN — MORPHINE SULFATE 5 MG: 2 INJECTION, SOLUTION INTRAMUSCULAR; INTRAVENOUS at 18:53

## 2017-04-19 RX ADMIN — MORPHINE SULFATE 5 MG: 2 INJECTION, SOLUTION INTRAMUSCULAR; INTRAVENOUS at 02:21

## 2017-04-19 RX ADMIN — MORPHINE SULFATE 5 MG: 2 INJECTION, SOLUTION INTRAMUSCULAR; INTRAVENOUS at 06:11

## 2017-04-19 RX ADMIN — POLYETHYLENE GLYCOL (3350) 17 G: 17 POWDER, FOR SOLUTION ORAL at 12:38

## 2017-04-19 RX ADMIN — TAZOBACTAM SODIUM AND PIPERACILLIN SODIUM 3.38 G: 375; 3 INJECTION, SOLUTION INTRAVENOUS at 19:30

## 2017-04-19 RX ADMIN — SODIUM CHLORIDE 125 ML/HR: 9 INJECTION, SOLUTION INTRAVENOUS at 10:33

## 2017-04-19 NOTE — PROGRESS NOTES
Continued Stay Note   [place]     Patient Name: Mahamed Greene  MRN: [id number]  Today's Date: 4/19/2017    Admit Date: 4/17/2017          Discharge Plan       04/19/17 1001    Case Management/Social Work Plan    Plan [facility]    Additional Comments Halley Hi met with family and told them they could have everything set up in home today if pt ready for discharge.  [facility], time unknown.        04/19/17 0919    Case Management/Social Work Plan    Plan [facility]    Additional Comments Informed Halley Hi from [facility] [phone] of plan to dc home with hospice when arrangements made.                Discharge Codes     None            TRENTON Irene

## 2017-04-19 NOTE — PROGRESS NOTES
Continued Stay Note   [place]     Patient Name: Mahmaed Greene  MRN: [id number]  Today's Date: 4/19/2017    Admit Date: 4/17/2017          Discharge Plan       04/19/17 0919    Case Management/Social Work Plan    Plan [facility]    Additional Comments Informed Halley Hi from [facility] [phone] of plan to dc home with hospice when arrangements made.                Discharge Codes     None            TRENTON Irene

## 2017-04-19 NOTE — PLAN OF CARE
Problem: Patient Care Overview (Adult)  Goal: Plan of Care Review    04/19/17 1555   Coping/Psychosocial Response Interventions   Plan Of Care Reviewed With patient   Patient Care Overview   Progress declining   Outcome Evaluation   Outcome Summary/Follow up Plan 5 mg IV morphine given q2 hours, patient has been resting comfortably all day         04/19/17 1555   Coping/Psychosocial Response Interventions   Plan Of Care Reviewed With patient   Patient Care Overview   Progress declining   Outcome Evaluation   Outcome Summary/Follow up Plan 5 mg IV morphine given q2 hours, patient has been resting comfortably all day, Continue supportive care. Home with hospice when all arrangements complete.         Problem: Oncology Care (Adult)  Goal: Signs and Symptoms of Listed Potential Problems Will be Absent or Manageable (Oncology Care)  Outcome: Ongoing (interventions implemented as appropriate)    Problem: Fall Risk (Adult)  Goal: Absence of Falls  Outcome: Ongoing (interventions implemented as appropriate)    Problem: Pressure Ulcer Risk (Edgar Scale) (Adult,Obstetrics,Pediatric)  Goal: Skin Integrity  Outcome: Ongoing (interventions implemented as appropriate)

## 2017-04-19 NOTE — PROGRESS NOTES
PROGRESS NOTE  Patient name: Mahamed Greene  Patient : 1950  VISIT # [id number]  MR #[id number]   Room: 462    SUBJECTIVE: rested better. Pain better. c/o Heartburn, better with Protonix and Tums    HISTORY OF PRESENT ILLNESS:  Mahamed Greene is a 67 year old gentleman initially evaluated 3/20/2017 for new findings including a 4.3 cm RLL lung mass with significant mediastinal adenopathy and several likely metastatic liver and bone lesions.  Mr Greene presents for a follow-up after recent hospitalization where he had a CT-guided biopsy of the liver lesions. He also had MRI of the thoracic spine and cervical spine MRI of the brain and a bone scan for further staging of his disease. He was found to have diffuse metastatic disease to the spine without cord compression. He has been seen by Dr. Murali Sloan for palliative radiation to the sites of symptomatic bone disease. The biopsy of the liver lesion was consistent with squamous cell carcinoma poorly differentiated, lung origin PDl-1, 1%.     He was being admitted from the ER with worsening of his overall performance, recent falls at home, worsening mental status, hypotension and pneumonia. He had a fever of 101.7c.     Diagnosis  · NSCLC, Poorly differentiated squamous cell carcinoma.   · PDL1 - 1%  Treatment summary  · Palliative RT bone lesions  treatements  Tumor target  · large lobular nodule measuring 4.3 cm   · Multiple mediastinal adenopathy.   · Liver lesions   · Bone lesions(L+T spine)  Lung mass-NSCLC, poorly differentiated SCC PDL-1 1%, stage IV  Mr Greene is a 67 years old male first seen by (Dr. Rosales) on 3/20/2017 further workup of a lung mass/mediastinal adenopathy and multiple liver and bone lesions.  · 3/1/2017-he presented to the ED at [facility] with complaints of chest pain and the new onset dry cough for 3 weeks. He had associated weight loss of about 13 pounds over the last couple months.   · 3/1/2017-CT PE  protocol and a CT of the abdomen/pelvis with contrast was performed. There was no evidence of pulmonary embolism. However, it showed a large lobular nodule measuring 4.3 cm in size in the right lower lobe. 11 mm nodule in the right posterior costophrenic angle within the right lower lobe. 4.3 cm right hilar lymph node. 2.4 cm right infrahilar nodes. 2.4 and 2.9 cm subcarinal node. Pretracheal node measuring 2.7 cm. 2 enlarged AP window lymph nodes measure 1.8 and 1.6 cm. Multiple right paratracheal nodes measuring 2.3 and 1.9 cm as well as a 2.4 cm high right paratracheal node. Emphysematous changes. Peripheral 10 mm nodes within the right upper lobe. Multiple liver lesions likely consistent metastatic disease. Lytic lesion involving the L1 pedicle with associated left-sided foraminal encroachment related to the soft tissue complement.   · MRI of the lumbar spine was completed 3/20/17 at  and showed numerous metastatic lesions to the skeleton, the largest visualized lesion involving L1 with approximately 40% replacement of marrow, with questionable superior endplate deformity suspicious for nondisplaced pathologic fracture. Additional lesions are visualized in bilateral iliac bones and involving all visualized vertebral bodies.   · 3/28/2017-MRI of the thoracic spine- Multiple metastatic foci within the thoracic and lumbar spine as described above. The most extensive involvement is noted at the L1 vertebral level. I do not see any associated pathologic fracture or compromise of the spinal canal. 2. There is some mild bulging of the discs within the mid and lower thoracic spine and upper lumbar column. There is mild associated central   narrowing of the canal at T11-T12. There is no evidence of contact with or contouring of the cord. The thoracic cord is normal in caliber with no evidence of abnormal T2 signal or expansile mass.  · 3/28/2017- MRI of the brain with contrast- 1. Atrophy with mild small vessel  ischemic disease. 2. No evidence of restricted diffusion to suggest acute ischemia or infarct. 3. No evidence of mass lesion or abnormal contrast enhancement to suggest metastatic disease to the brain.  · 3/28/2017- MRI of the cervical spine- No evidence of metastatic disease to the cervical spine. Multilevel degenerative changes.  · 3/29/2017-bone scan- There is abnormal uptake in the L1 vertebral body in the left pedicle region. There is a destructive bone lesion in this area on CT.  There is mild uptake in the right T10 pedicle. There is mild uptake in the left T8 transverse process. There is abnormal uptake in the left anterior third rib and the left lateral fifth rib. There is probable degenerative uptake in the right AC joint. There is abnormal uptake in the left humeral head and in the mid to distal shaft of the left humerus. There is abnormal uptake in the proximal left femur in the region of the lesser trochanter. There is abnormal uptake in the proximal shaft of the right femur. There is absence of activity along the superior margin of the right iliac wing. There does appear to be a destructive lesion in this location on the CT images.     03/29/2017- CT guided liver biopsy- Metastatic Non-small cell carcinoma, with immunohistochemical features consistent with poorly differentiated squamous cell carcinoma. PDL1- 1.%  ·    · 3/30/2017- x-ray humerus bilaterally-Lytic lesion in the distal humeral diaphysis on the LEFT is consistent with osseous metastatic disease.2. Questionable smaller lucent lesion in the more proximal LEFT humeral diaphysis could be artifactual or could represent a very small lytic lesion at this location. It should be noted that this lesion was not hot on the nuclear medicine bone scan. This could be related to its small size, they could be no bone turnover at this location, or this second lesion could be artifactual.    Vitals:    04/19/17 0722   BP: 120/73   Pulse: 110   Resp: 20    Temp: 97.4 °F (36.3 °C)   SpO2: 97%       Intake/Output Summary (Last 24 hours) at 04/19/17 0740  Last data filed at 04/19/17 0400   Gross per 24 hour   Intake             2885 ml   Output             1850 ml   Net             1035 ml       PHYSICAL EXAM:   CONSTITUTIONAL: Alert, no acute distress  EYES: Non icteric, EOM intact, pupils equal round   ENT: Mucus membranes moist, no oral pharyngeal lesions   NECK: Supple, no masses   CHEST/LUNGS: rhonchi decreased, normal respiratory effort, supplemental O2 in use  CARDIOVASCULAR: RRR, no murmurs  ABDOMEN: soft non-tender, active bowel sounds, no HSM  EXTREMITIES: warm, moves all fours  SKIN: warm, dry with no rashes or lesions  LYMPH: No cervical, clavicular, axillary, or inguinal lymphadenopathy  NEUROLOGIC: follows commands, non focal     CBC    Results from last 7 days  Lab Units 04/17/17  1052   WBC 10*3/mm3 6.26   HEMOGLOBIN g/dL 9.3*   HEMATOCRIT % 28.7*   PLATELETS 10*3/mm3 114*         Lab Results   Component Value Date     04/17/2017    K 5.2 04/17/2017     04/17/2017    CO2 25.0 04/17/2017    BUN 41 (H) 04/17/2017    CREATININE 1.24 04/17/2017    GLUCOSE 123 (H) 04/17/2017    CALCIUM 10.5 (H) 04/17/2017    BILITOT 0.5 04/17/2017    ALKPHOS 159 (H) 04/17/2017    AST 38 04/17/2017    ALT 23 04/17/2017    GLOB 3.5 04/17/2017       Lab Results   Component Value Date    INR 1.02 03/28/2017    INR 1.03 03/01/2017    INR 0.99 02/15/2016    PROTIME 13.7 03/28/2017    PROTIME 13.8 03/01/2017    PROTIME 13.4 02/15/2016        CXR 04/17/2017  1. A 3.4 cm density projected over the lower right hilum may represent a  known pulmonary nodule/mass.  2. Patchy infiltrate at the right lung base could represent pneumonia.  3. There is volume loss in the right lung compared to the left.    MRI T spine 04/17/2017  Progression of osseous metastases compared to the 03/28/2017  examination.         ASSESSMENT/PLAN:     1.  Sepsis- PNA 2/2 aspiration  -IVF continues  125 cc.hr  -Blood cultures- NGTD, afebrile  -Zozyn 3.375 g IV q 6h      NSCLC/SCC low expressor PDL-1(1%)- Poor Performance status  -DNR  -Anticipating home with hospice when all arrangements complete      Cancer related pain, improved  -Back pain- 2/2 bone mets  -Morphine 4 mg q 3 h prn pain  -Morphine SR 30mg q 12h     Confusion- 2/2 sepsis  -Improved with IVF and atbxs    Anemia- 2/2 cancer, anemia of chronic disease.   -No need for transfusion`     Continue supportive care. Home with hospice when all arrangements complete.        Jolene Escobedo, APRN    04/19/17  7:40 AM    Late entry  I saw and examined Mahamed 04/19/2017 and had a conference with his daughter  Plans are in place to go home with hospice for terminal supportive care without further chemotherapeutic intervention.     I personally saw and examined this patient and agree with the assessment and plan outlined in this note.   The treatment plan was discussed and developed with Jolene INMAN.    Questions were encouraged, asked and answered to their understanding and satisfaction.    Bharath Mccall MD  4/20/2017 8:04 AM

## 2017-04-19 NOTE — PLAN OF CARE
Problem: Patient Care Overview (Adult)  Goal: Plan of Care Review  Outcome: Ongoing (interventions implemented as appropriate)    04/18/17 1636 04/18/17 2000 04/19/17 0433   Coping/Psychosocial Response Interventions   Plan Of Care Reviewed With --  patient;daughter --    Patient Care Overview   Progress declining --  --    Outcome Evaluation   Outcome Summary/Follow up Plan --  --  5 mg IV morphine given q2 hours. xanax, ambien given per orders for sleeplessness. pt still restless, sleeps off and on. pain meds given every 2 hours prn per orders. frequent turns implimented. vss.         Problem: Oncology Care (Adult)  Goal: Signs and Symptoms of Listed Potential Problems Will be Absent or Manageable (Oncology Care)  Outcome: Ongoing (interventions implemented as appropriate)    Problem: Fall Risk (Adult)  Goal: Absence of Falls  Outcome: Ongoing (interventions implemented as appropriate)    Problem: Pressure Ulcer Risk (Edgar Scale) (Adult,Obstetrics,Pediatric)  Goal: Skin Integrity  Outcome: Ongoing (interventions implemented as appropriate)

## 2017-04-20 VITALS
HEART RATE: 115 BPM | TEMPERATURE: 97.3 F | DIASTOLIC BLOOD PRESSURE: 78 MMHG | WEIGHT: 213.31 LBS | OXYGEN SATURATION: 98 % | HEIGHT: 72 IN | BODY MASS INDEX: 28.89 KG/M2 | SYSTOLIC BLOOD PRESSURE: 122 MMHG | RESPIRATION RATE: 18 BRPM

## 2017-04-20 PROBLEM — R53.1 GENERALIZED WEAKNESS: Status: ACTIVE | Noted: 2017-04-20

## 2017-04-20 PROBLEM — A41.9 SEPSIS (HCC): Status: ACTIVE | Noted: 2017-04-20

## 2017-04-20 PROBLEM — G89.3 PAIN FROM BONE METASTASES (HCC): Status: ACTIVE | Noted: 2017-04-20

## 2017-04-20 PROBLEM — C79.51 PAIN FROM BONE METASTASES (HCC): Status: ACTIVE | Noted: 2017-04-20

## 2017-04-20 PROBLEM — C34.90 NON-SMALL CELL LUNG CANCER (NSCLC) (HCC): Status: ACTIVE | Noted: 2017-04-20

## 2017-04-20 PROCEDURE — 25010000002 PIPERACILLIN SOD-TAZOBACTAM PER 1 G: Performed by: INTERNAL MEDICINE

## 2017-04-20 PROCEDURE — 25010000002 MORPHINE SULFATE (PF) 2 MG/ML SOLUTION: Performed by: INTERNAL MEDICINE

## 2017-04-20 PROCEDURE — 25010000002 MORPHINE SULFATE (PF) 2 MG/ML SOLUTION: Performed by: NURSE PRACTITIONER

## 2017-04-20 RX ORDER — ALPRAZOLAM 1 MG/1
1 TABLET ORAL EVERY 12 HOURS PRN
Qty: 60 TABLET | Refills: 0
Start: 2017-04-20 | End: 2017-05-20

## 2017-04-20 RX ORDER — ONDANSETRON 2 MG/ML
4 INJECTION INTRAMUSCULAR; INTRAVENOUS EVERY 6 HOURS PRN
Qty: 2 ML | Refills: 0
Start: 2017-04-20 | End: 2017-04-30

## 2017-04-20 RX ORDER — MORPHINE SULFATE 30 MG/1
60 TABLET, FILM COATED, EXTENDED RELEASE ORAL EVERY 12 HOURS
Qty: 60 TABLET | Refills: 0 | Status: SHIPPED | OUTPATIENT
Start: 2017-04-20

## 2017-04-20 RX ORDER — MORPHINE SULFATE 30 MG/1
30 TABLET, FILM COATED, EXTENDED RELEASE ORAL EVERY 12 HOURS SCHEDULED
Qty: 60 TABLET | Refills: 0 | Status: CANCELLED
Start: 2017-04-20 | End: 2017-05-20

## 2017-04-20 RX ORDER — MORPHINE SULFATE 2 MG/ML
8 INJECTION, SOLUTION INTRAMUSCULAR; INTRAVENOUS
Qty: 4 ML | Refills: 0
Start: 2017-04-20

## 2017-04-20 RX ORDER — MORPHINE SULFATE 60 MG/1
60 TABLET, FILM COATED, EXTENDED RELEASE ORAL EVERY 12 HOURS SCHEDULED
Status: DISCONTINUED | OUTPATIENT
Start: 2017-04-20 | End: 2017-04-20 | Stop reason: HOSPADM

## 2017-04-20 RX ORDER — MORPHINE SULFATE 2 MG/ML
8 INJECTION, SOLUTION INTRAMUSCULAR; INTRAVENOUS
Status: DISCONTINUED | OUTPATIENT
Start: 2017-04-20 | End: 2017-04-20 | Stop reason: HOSPADM

## 2017-04-20 RX ADMIN — MORPHINE SULFATE 8 MG: 2 INJECTION, SOLUTION INTRAMUSCULAR; INTRAVENOUS at 17:02

## 2017-04-20 RX ADMIN — ALPRAZOLAM 1 MG: 0.5 TABLET ORAL at 04:40

## 2017-04-20 RX ADMIN — POLYETHYLENE GLYCOL (3350) 17 G: 17 POWDER, FOR SOLUTION ORAL at 08:20

## 2017-04-20 RX ADMIN — MORPHINE SULFATE 5 MG: 2 INJECTION, SOLUTION INTRAMUSCULAR; INTRAVENOUS at 00:59

## 2017-04-20 RX ADMIN — MORPHINE SULFATE 5 MG: 2 INJECTION, SOLUTION INTRAMUSCULAR; INTRAVENOUS at 04:56

## 2017-04-20 RX ADMIN — MORPHINE SULFATE 5 MG: 2 INJECTION, SOLUTION INTRAMUSCULAR; INTRAVENOUS at 03:11

## 2017-04-20 RX ADMIN — TAZOBACTAM SODIUM AND PIPERACILLIN SODIUM 3.38 G: 375; 3 INJECTION, SOLUTION INTRAVENOUS at 01:10

## 2017-04-20 RX ADMIN — MORPHINE SULFATE 8 MG: 2 INJECTION, SOLUTION INTRAMUSCULAR; INTRAVENOUS at 10:07

## 2017-04-20 RX ADMIN — TAZOBACTAM SODIUM AND PIPERACILLIN SODIUM 3.38 G: 375; 3 INJECTION, SOLUTION INTRAVENOUS at 07:35

## 2017-04-20 RX ADMIN — MORPHINE SULFATE 8 MG: 2 INJECTION, SOLUTION INTRAMUSCULAR; INTRAVENOUS at 12:17

## 2017-04-20 RX ADMIN — TAZOBACTAM SODIUM AND PIPERACILLIN SODIUM 3.38 G: 375; 3 INJECTION, SOLUTION INTRAVENOUS at 15:03

## 2017-04-20 RX ADMIN — MORPHINE SULFATE 5 MG: 2 INJECTION, SOLUTION INTRAMUSCULAR; INTRAVENOUS at 07:35

## 2017-04-20 RX ADMIN — MORPHINE SULFATE 8 MG: 2 INJECTION, SOLUTION INTRAMUSCULAR; INTRAVENOUS at 15:03

## 2017-04-20 RX ADMIN — PANTOPRAZOLE SODIUM 40 MG: 40 INJECTION, POWDER, FOR SOLUTION INTRAVENOUS at 06:26

## 2017-04-20 RX ADMIN — MORPHINE SULFATE 60 MG: 60 TABLET, EXTENDED RELEASE ORAL at 08:20

## 2017-04-20 NOTE — PLAN OF CARE
Problem: Patient Care Overview (Adult)  Goal: Plan of Care Review  Outcome: Ongoing (interventions implemented as appropriate)    04/20/17 0327   Coping/Psychosocial Response Interventions   Plan Of Care Reviewed With patient;daughter   Patient Care Overview   Progress declining   Outcome Evaluation   Outcome Summary/Follow up Plan Pt receiving 5 mg IV morphine q2 hours, still experiencing breakthrough pain. Family remains at bedside. Plan to discharge home with Hospice.        Goal: Adult Individualization and Mutuality  Outcome: Ongoing (interventions implemented as appropriate)  Goal: Discharge Needs Assessment  Outcome: Ongoing (interventions implemented as appropriate)    Problem: Oncology Care (Adult)  Goal: Signs and Symptoms of Listed Potential Problems Will be Absent or Manageable (Oncology Care)  Outcome: Ongoing (interventions implemented as appropriate)    Problem: Fall Risk (Adult)  Goal: Absence of Falls  Outcome: Ongoing (interventions implemented as appropriate)    Problem: Pressure Ulcer Risk (Edgar Scale) (Adult,Obstetrics,Pediatric)  Goal: Skin Integrity  Outcome: Ongoing (interventions implemented as appropriate)

## 2017-04-20 NOTE — DISCHARGE SUMMARY
DISCHARGE SUMMARY    Admitting Provider: Dr. Bharath Mccall    Date of Admission: 4/17/2017  Date of Discharge:  4/20/2017    Admitting Diagnosis:   Pain from bone metastases [G89.3, C79.51]  Pneumonia of both lungs due to infectious organism, unspecified part of lung [J18.9]  Sepsis  NSCLC    Discharge Diagnosis:     Stage IV NSCLC to liver and bones    RLL Pneumonia    Pain from bone metastases    Generalized weakness        History:  Mahamed Greene is a 67 year old gentleman diagnosed with stage IV, non-small cell lung cancer with both hepatic and diffuse bony metastasis on 3/29/17. He elected to hold off on systemic chemotherapy and initiated XRT on 4/4/17 to the painful bony metastasis including the left and right shoulders, thoracic and lumbar spine.  He completed 7 of 10 planned treatment fractions to each area. Please refer to the history and physical for further delineation of his tumor history.     Mahamed was admitted from the ER on 04/17/17 with worsening of his overall performance, recent falls at home, worsening mental status, hypotension and pneumonia. He had a fever of 101.7c.    Hospital Course:  Mahamed was treated with IV hydration and antibiotic therapy with Zosyn 3.375 g IV q 6hrs for RLL pneumonia. Confusion and hypotension secondary to sepsis improved with treatment. Symptomatically, from a respiratory standpoint, symptoms improved. Pain control however was his biggest issue. Mahamed required increase in MS Contin as well as increasing doses of intravenous analgesia with morphine every 2 hours. Both Mahamed and his family ultimately decided against further treatment for metastatic NSCLC. Though improved, he continues to have discomfort related to bony metastasis and will be [facility] under the direction of Dr. Grover for continued pain control.      Consults:   Consults     No orders found from 3/19/2017 to 4/18/2017.           Pertinent Test  "Results:   Lab Results (last 24 hours)     Procedure Component Value Units Date/Time    Blood Culture [[id number]]  (Normal) Collected:  04/17/17 1620    Specimen:  Blood from Arm, Right Updated:  04/19/17 1701     Blood Culture No growth at 2 days    Blood Culture [[id number]]  (Normal) Collected:  04/17/17 1052    Specimen:  Blood from Arm, Left Updated:  04/19/17 1901     Blood Culture No growth at 2 days          Cultures:    Blood Culture   Date Value Ref Range Status   04/17/2017 No growth at 2 days  Preliminary   04/17/2017 No growth at 2 days  Preliminary       Condition on Discharge:  poor    Vital Signs  /78 (BP Location: Right arm, Patient Position: Lying)  Pulse 115  Temp 97.3 °F (36.3 °C) (Oral)   Resp 18  Ht 72\" (182.9 cm)  Wt 213 lb 5 oz (96.8 kg)  SpO2 98%  BMI 28.93 kg/m2    Discharge Disposition  Hospice/Medical Facility (DC - External)    Discharge Medications   Mahamed Greene   Home Medication Instructions AILYN:[id number]    Printed on:04/20/17 6606   Medication Information                      albuterol (PROVENTIL) (2.5 MG/3ML) 0.083% nebulizer solution  Take 2.5 mg by nebulization Every 4 (Four) Hours As Needed for wheezing.             ALPRAZolam (XANAX) 1 MG tablet  Take 1 tablet by mouth Every 12 (Twelve) Hours As Needed for Anxiety. At HS             ALPRAZolam (XANAX) 1 MG tablet  Take 1 tablet by mouth Every 12 (Twelve) Hours As Needed for Anxiety for up to 30 days.             cyclobenzaprine (FLEXERIL) 10 MG tablet  Take 10 mg by mouth 3 (Three) Times a Day.             DULoxetine (CYMBALTA) 60 MG capsule  Take 120 mg by mouth Every Night.             gabapentin (NEURONTIN) 300 MG capsule  Take 300 mg by mouth Every Night.             HYDROmorphone (DILAUDID) 8 MG tablet  Take 1 tablet by mouth Every 4 (Four) Hours As Needed for Moderate Pain (4-6).             losartan (COZAAR) 100 MG tablet  Take 50 mg by mouth Daily.             melatonin 3 MG tablet  Take 3 mg by " mouth Every Night.             Morphine (MS CONTIN) 30 MG 12 hr tablet  Take 2 tablets by mouth Every 12 (Twelve) Hours.             Morphine sulfate, PF, 2 MG/ML solution injection  Infuse 4 mL into a venous catheter Every 2 (Two) Hours As Needed (pain).             nabumetone (RELAFEN) 750 MG tablet  Take 750 mg by mouth Every 12 (Twelve) Hours.             ondansetron (ZOFRAN) 4 MG/2ML injection  Infuse 2 mL into a venous catheter Every 6 (Six) Hours As Needed for Nausea or Vomiting for up to 10 days.             pantoprazole (PROTONIX) 40 MG EC tablet  Take 40 mg by mouth Every Morning.             polyethylene glycol (MIRALAX) packet  Take 17 g by mouth Every Night.             tiotropium (SPIRIVA) 18 MCG per inhalation capsule  Place 1 capsule into inhaler and inhale Daily.             traZODone (DESYREL) 100 MG tablet  Take 50 mg by mouth Every Night.             zolpidem (AMBIEN) 10 MG tablet  Take 10 mg by mouth At Night As Needed for Sleep.                 Discharge Diet   Diet Instructions     Diet: Regular; Thin Liquids, No Restrictions       Discharge Diet:  Regular   Fluid Consistency:  Thin Liquids, No Restrictions               Regular diet as tolerated.                   Activity at Discharge  Activity Instructions     Other Instructions (Specify)       Up with assist            Follow-up Appointments  Future Appointments  Date Time Provider Department Center   4/21/2017 4:00 PM [facility]   5/1/2017 1:30 PM PAD RAD ONC BILLING ONLY [facility]   6/5/2017 1:30 PM PAD RAD ONC BILLING ONLY [facility]       Test Results Pending at Discharge   Order Current Status    Blood Culture Preliminary result    Blood Culture Preliminary result                  Jolene Escobedo, MARISOL  4/20/2017  3:56 PM

## 2017-04-20 NOTE — PROGRESS NOTES
PROGRESS NOTE  Patient name: Mahamed Greene  Patient : 1950  VISIT # [id number]  MR #[id number]   Room: 462    SUBJECTIVE: not resting well. Pain worse through night and this am    HISTORY OF PRESENT ILLNESS:  Mahamed Greene is a 67 year old gentleman initially evaluated 3/20/2017 for new findings including a 4.3 cm RLL lung mass with significant mediastinal adenopathy and several likely metastatic liver and bone lesions.  Mr Greene presents for a follow-up after recent hospitalization where he had a CT-guided biopsy of the liver lesions. He also had MRI of the thoracic spine and cervical spine MRI of the brain and a bone scan for further staging of his disease. He was found to have diffuse metastatic disease to the spine without cord compression. He has been seen by Dr. Murali Sloan for palliative radiation to the sites of symptomatic bone disease. The biopsy of the liver lesion was consistent with squamous cell carcinoma poorly differentiated, lung origin PDl-1, 1%.     He was being admitted from the ER with worsening of his overall performance, recent falls at home, worsening mental status, hypotension and pneumonia. He had a fever of 101.7c.     Diagnosis  · NSCLC, Poorly differentiated squamous cell carcinoma.   · PDL1 - 1%  Treatment summary  · Palliative RT bone lesions  treatements  Tumor target  · large lobular nodule measuring 4.3 cm   · Multiple mediastinal adenopathy.   · Liver lesions   · Bone lesions(L+T spine)  Lung mass-NSCLC, poorly differentiated SCC PDL-1 1%, stage IV  Mr Greene is a 67 years old male first seen by (Dr. Rosales) on 3/20/2017 further workup of a lung mass/mediastinal adenopathy and multiple liver and bone lesions.  · 3/1/2017-he presented to the ED at [facility] with complaints of chest pain and the new onset dry cough for 3 weeks. He had associated weight loss of about 13 pounds over the last couple months.   · 3/1/2017-CT PE protocol and a CT of  the abdomen/pelvis with contrast was performed. There was no evidence of pulmonary embolism. However, it showed a large lobular nodule measuring 4.3 cm in size in the right lower lobe. 11 mm nodule in the right posterior costophrenic angle within the right lower lobe. 4.3 cm right hilar lymph node. 2.4 cm right infrahilar nodes. 2.4 and 2.9 cm subcarinal node. Pretracheal node measuring 2.7 cm. 2 enlarged AP window lymph nodes measure 1.8 and 1.6 cm. Multiple right paratracheal nodes measuring 2.3 and 1.9 cm as well as a 2.4 cm high right paratracheal node. Emphysematous changes. Peripheral 10 mm nodes within the right upper lobe. Multiple liver lesions likely consistent metastatic disease. Lytic lesion involving the L1 pedicle with associated left-sided foraminal encroachment related to the soft tissue complement.   · MRI of the lumbar spine was completed 3/20/17 at  and showed numerous metastatic lesions to the skeleton, the largest visualized lesion involving L1 with approximately 40% replacement of marrow, with questionable superior endplate deformity suspicious for nondisplaced pathologic fracture. Additional lesions are visualized in bilateral iliac bones and involving all visualized vertebral bodies.   · 3/28/2017-MRI of the thoracic spine- Multiple metastatic foci within the thoracic and lumbar spine as described above. The most extensive involvement is noted at the L1 vertebral level. I do not see any associated pathologic fracture or compromise of the spinal canal. 2. There is some mild bulging of the discs within the mid and lower thoracic spine and upper lumbar column. There is mild associated central   narrowing of the canal at T11-T12. There is no evidence of contact with or contouring of the cord. The thoracic cord is normal in caliber with no evidence of abnormal T2 signal or expansile mass.  · 3/28/2017- MRI of the brain with contrast- 1. Atrophy with mild small vessel ischemic disease. 2. No  evidence of restricted diffusion to suggest acute ischemia or infarct. 3. No evidence of mass lesion or abnormal contrast enhancement to suggest metastatic disease to the brain.  · 3/28/2017- MRI of the cervical spine- No evidence of metastatic disease to the cervical spine. Multilevel degenerative changes.  · 3/29/2017-bone scan- There is abnormal uptake in the L1 vertebral body in the left pedicle region. There is a destructive bone lesion in this area on CT.  There is mild uptake in the right T10 pedicle. There is mild uptake in the left T8 transverse process. There is abnormal uptake in the left anterior third rib and the left lateral fifth rib. There is probable degenerative uptake in the right AC joint. There is abnormal uptake in the left humeral head and in the mid to distal shaft of the left humerus. There is abnormal uptake in the proximal left femur in the region of the lesser trochanter. There is abnormal uptake in the proximal shaft of the right femur. There is absence of activity along the superior margin of the right iliac wing. There does appear to be a destructive lesion in this location on the CT images.     03/29/2017- CT guided liver biopsy- Metastatic Non-small cell carcinoma, with immunohistochemical features consistent with poorly differentiated squamous cell carcinoma. PDL1- 1.%  ·    · 3/30/2017- x-ray humerus bilaterally-Lytic lesion in the distal humeral diaphysis on the LEFT is consistent with osseous metastatic disease.2. Questionable smaller lucent lesion in the more proximal LEFT humeral diaphysis could be artifactual or could represent a very small lytic lesion at this location. It should be noted that this lesion was not hot on the nuclear medicine bone scan. This could be related to its small size, they could be no bone turnover at this location, or this second lesion could be artifactual.    Vitals:    04/20/17 0440   BP: 125/86   Pulse: 115   Resp:    Temp: 97.6 °F (36.4 °C)    SpO2: 95%       Intake/Output Summary (Last 24 hours) at 04/20/17 0723  Last data filed at 04/20/17 0445   Gross per 24 hour   Intake          6085.83 ml   Output             1700 ml   Net          4385.83 ml       PHYSICAL EXAM:   CONSTITUTIONAL: Alert, no acute distress, daughter at bedside. C/o pain to sternum and neck  EYES: Non icteric, EOM intact, pupils equal round   ENT: Mucus membranes dry, no oral pharyngeal lesions   NECK: Supple, no masses   CHEST/LUNGS: rhonchi decreased, normal respiratory effort, supplemental O2 in use  CARDIOVASCULAR: RRR, no murmurs  ABDOMEN: soft non-tender, active bowel sounds, no HSM  EXTREMITIES: warm, moves all fours  SKIN: warm, dry with no rashes or lesions  LYMPH: No cervical, clavicular, axillary, or inguinal lymphadenopathy  NEUROLOGIC: follows commands, non focal     CBC    Results from last 7 days  Lab Units 04/17/17  1052   WBC 10*3/mm3 6.26   HEMOGLOBIN g/dL 9.3*   HEMATOCRIT % 28.7*   PLATELETS 10*3/mm3 114*         Lab Results   Component Value Date     04/17/2017    K 5.2 04/17/2017     04/17/2017    CO2 25.0 04/17/2017    BUN 41 (H) 04/17/2017    CREATININE 1.24 04/17/2017    GLUCOSE 123 (H) 04/17/2017    CALCIUM 10.5 (H) 04/17/2017    BILITOT 0.5 04/17/2017    ALKPHOS 159 (H) 04/17/2017    AST 38 04/17/2017    ALT 23 04/17/2017    GLOB 3.5 04/17/2017       Lab Results   Component Value Date    INR 1.02 03/28/2017    INR 1.03 03/01/2017    INR 0.99 02/15/2016    PROTIME 13.7 03/28/2017    PROTIME 13.8 03/01/2017    PROTIME 13.4 02/15/2016        CXR 04/17/2017  1. A 3.4 cm density projected over the lower right hilum may represent a  known pulmonary nodule/mass.  2. Patchy infiltrate at the right lung base could represent pneumonia.  3. There is volume loss in the right lung compared to the left.    MRI T spine 04/17/2017  Progression of osseous metastases compared to the 03/28/2017  examination.       ASSESSMENT/PLAN:     Sepsis- PNA 2/2  aspiration  -IVF continues 125 cc.hr  -Blood cultures- NGTD, afebrile  -Zozyn 3.375 g IV q 6h      NSCLC/SCC low expressor PDL-1(1%)- Poor Performance status  -DNR  -Anticipating home with hospice when hospital bed delivered and pain better controlled      Cancer related pain, improved  -Back pain, sternum and neck- 2/2 bone mets  -Morphine 8 mg q 2 h prn pain  -Increase Morphine SR 60 mg q 12h     Confusion, improved  -2/2 sepsis  -Improved with IVF and atbxs  -interactive, appropriated    Anemia- 2/2 cancer, anemia of chronic disease.   -No need for transfusion       Continue supportive care. Home with hospice when pain better controlled.        Jolene Escobedo, APRN    04/20/17  7:23 AM       I personally saw and examined this patient and agree with the assessment and plan outlined in this note.   The treatment plan was discussed and developed with Jolene INMAN.    Questions were encouraged, asked and answered to their understanding and satisfaction.    Bharath Mccall MD  4/20/2017 8:13 AM

## 2017-04-22 PROBLEM — J96.21 ACUTE ON CHRONIC RESPIRATORY FAILURE WITH HYPOXIA AND HYPERCAPNIA (HCC): Status: ACTIVE | Noted: 2017-01-01

## 2017-04-22 PROBLEM — C34.90 METASTATIC LUNG CANCER (METASTASIS FROM LUNG TO OTHER SITE) (HCC): Chronic | Status: ACTIVE | Noted: 2017-01-01

## 2017-04-22 PROBLEM — J44.9 COPD (CHRONIC OBSTRUCTIVE PULMONARY DISEASE) (HCC): Chronic | Status: ACTIVE | Noted: 2017-01-01

## 2017-04-22 PROBLEM — J96.22 ACUTE ON CHRONIC RESPIRATORY FAILURE WITH HYPOXIA AND HYPERCAPNIA (HCC): Status: ACTIVE | Noted: 2017-01-01

## 2017-04-22 LAB
BACTERIA SPEC AEROBE CULT: NORMAL
BACTERIA SPEC AEROBE CULT: NORMAL

## 2017-05-01 ENCOUNTER — HOSPITAL ENCOUNTER (OUTPATIENT)
Dept: RADIATION ONCOLOGY | Facility: HOSPITAL | Age: 67
Setting detail: RADIATION/ONCOLOGY SERIES
End: 2017-05-01

## 2017-06-05 ENCOUNTER — HOSPITAL ENCOUNTER (OUTPATIENT)
Dept: RADIATION ONCOLOGY | Facility: HOSPITAL | Age: 67
Setting detail: RADIATION/ONCOLOGY SERIES
End: 2017-06-05